# Patient Record
Sex: FEMALE | Race: WHITE | NOT HISPANIC OR LATINO | Employment: FULL TIME | URBAN - METROPOLITAN AREA
[De-identification: names, ages, dates, MRNs, and addresses within clinical notes are randomized per-mention and may not be internally consistent; named-entity substitution may affect disease eponyms.]

---

## 2021-01-22 ENCOUNTER — IMMUNIZATION (OUTPATIENT)
Dept: PEDIATRICS | Facility: CLINIC | Age: 31
End: 2021-01-22
Payer: COMMERCIAL

## 2021-01-22 PROCEDURE — 0001A PR COVID VAC PFIZER DIL RECON 30 MCG/0.3 ML IM: CPT

## 2021-01-22 PROCEDURE — 91300 PR COVID VAC PFIZER DIL RECON 30 MCG/0.3 ML IM: CPT

## 2021-02-12 ENCOUNTER — IMMUNIZATION (OUTPATIENT)
Dept: PEDIATRICS | Facility: CLINIC | Age: 31
End: 2021-02-12
Attending: INTERNAL MEDICINE
Payer: COMMERCIAL

## 2021-02-12 PROCEDURE — 91300 PR COVID VAC PFIZER DIL RECON 30 MCG/0.3 ML IM: CPT

## 2021-02-12 PROCEDURE — 0002A PR COVID VAC PFIZER DIL RECON 30 MCG/0.3 ML IM: CPT

## 2021-03-07 ENCOUNTER — HEALTH MAINTENANCE LETTER (OUTPATIENT)
Age: 31
End: 2021-03-07

## 2021-09-10 ENCOUNTER — TRANSFERRED RECORDS (OUTPATIENT)
Dept: HEALTH INFORMATION MANAGEMENT | Facility: CLINIC | Age: 31
End: 2021-09-10
Payer: COMMERCIAL

## 2021-09-24 ENCOUNTER — MEDICAL CORRESPONDENCE (OUTPATIENT)
Dept: HEALTH INFORMATION MANAGEMENT | Facility: CLINIC | Age: 31
End: 2021-09-24

## 2021-09-24 ENCOUNTER — TRANSFERRED RECORDS (OUTPATIENT)
Dept: HEALTH INFORMATION MANAGEMENT | Facility: CLINIC | Age: 31
End: 2021-09-24

## 2021-09-27 ENCOUNTER — TRANSCRIBE ORDERS (OUTPATIENT)
Dept: MATERNAL FETAL MEDICINE | Facility: CLINIC | Age: 31
End: 2021-09-27

## 2021-09-27 DIAGNOSIS — O26.90 PREGNANCY RELATED CONDITION, ANTEPARTUM: Primary | ICD-10-CM

## 2021-11-05 ENCOUNTER — HOSPITAL ENCOUNTER (OUTPATIENT)
Dept: ULTRASOUND IMAGING | Facility: CLINIC | Age: 31
End: 2021-11-05
Attending: OBSTETRICS & GYNECOLOGY
Payer: COMMERCIAL

## 2021-11-05 ENCOUNTER — OFFICE VISIT (OUTPATIENT)
Dept: MATERNAL FETAL MEDICINE | Facility: CLINIC | Age: 31
End: 2021-11-05
Attending: OBSTETRICS & GYNECOLOGY
Payer: COMMERCIAL

## 2021-11-05 DIAGNOSIS — O26.90 PREGNANCY RELATED CONDITION, ANTEPARTUM: ICD-10-CM

## 2021-11-05 DIAGNOSIS — O35.BXX1: Primary | ICD-10-CM

## 2021-11-05 PROCEDURE — 76811 OB US DETAILED SNGL FETUS: CPT

## 2021-11-05 PROCEDURE — 76811 OB US DETAILED SNGL FETUS: CPT | Mod: 26 | Performed by: OBSTETRICS & GYNECOLOGY

## 2021-11-05 NOTE — PROGRESS NOTES
Please refer to ultrasound report under 'Imaging' Studies of 'Chart Review' tabs.    Celso Eason M.D.

## 2021-11-12 ENCOUNTER — TRANSFERRED RECORDS (OUTPATIENT)
Dept: HEALTH INFORMATION MANAGEMENT | Facility: CLINIC | Age: 31
End: 2021-11-12
Payer: COMMERCIAL

## 2021-11-18 ENCOUNTER — OFFICE VISIT (OUTPATIENT)
Dept: CARDIOLOGY | Facility: CLINIC | Age: 31
End: 2021-11-18
Payer: COMMERCIAL

## 2021-11-18 ENCOUNTER — ANCILLARY PROCEDURE (OUTPATIENT)
Dept: ULTRASOUND IMAGING | Facility: HOSPITAL | Age: 31
End: 2021-11-18
Attending: OBSTETRICS & GYNECOLOGY
Payer: COMMERCIAL

## 2021-11-18 DIAGNOSIS — O35.BXX0 ANOMALY OF HEART OF FETUS AFFECTING PREGNANCY, ANTEPARTUM, SINGLE OR UNSPECIFIED FETUS: Primary | ICD-10-CM

## 2021-11-18 DIAGNOSIS — O35.BXX1: ICD-10-CM

## 2021-11-18 PROCEDURE — 93325 DOPPLER ECHO COLOR FLOW MAPG: CPT

## 2021-11-18 PROCEDURE — 76827 ECHO EXAM OF FETAL HEART: CPT

## 2021-11-18 PROCEDURE — 99204 OFFICE O/P NEW MOD 45 MIN: CPT | Mod: 25 | Performed by: PEDIATRICS

## 2021-11-18 PROCEDURE — 76825 ECHO EXAM OF FETAL HEART: CPT

## 2021-11-18 NOTE — PROGRESS NOTES
Fetal Cardiology Consultation    Patient:  Trupti Harvey MRN:  [id number]   YOB: 1990 Age:  31 year old   Date of Visit:  2021 PCP:  Narda Peng MD   KEEGAN: 2022, by Est. Date of Conception EGA: 20w5d weeks     Dear Dr. Eason:    I had the pleasure of seeing Trupti Harvey at the [facility] Fetal Echocardiography Laboratory in [place] on 2021 in consultation for fetal echocardiography results. She presented today accompanied by her partner. As you know, she is a 31 year old female with current pregnancy achieved through IVF, and suspected fetal ventricular septal defect on [facility] US.    The fetal echocardiogram was abnormal: small midseptal muscular ventricular septal defect. Otherwise normal cardiac anatomy. Normal right and left ventricular size and systolic function. No effusion.     I reviewed and interpreted the fetal echocardiogram today. I discussed the anatomy, physiology, expected fetal course, and need for post-melvi confirmation. The fetal cardiac anatomy is not expected to be dependent on the ductus arteriosus after birth. The expected post-melvi intervention will depend on confirmation of these findings, and is likely to include observation only.     -- No additional fetal echocardiograms are recommended for this pregnancy.  -- A post-melvi transthoracic echocardiogram is recommended to re-evaluate anatomy; this can occur as an outpatient within 2-3 weeks after delivery.  -- Delivery can occur at [facility] as planned.    Thank you for allowing me to participate in Ms. Harvey's care. Please don't hesitate to contact me or the Fetal Cardiology team at [facility] with any questions or concerns.     I spent a total of 30 minutes on the date of the encounter doing chart review, patient history, documentation, counseling, and coordinating care.    Nishant Cervantes MD  Pediatric Cardiology  [facility]  [facility]  Phone [phone]    Review of the result(s) of each unique test - fetal echocardiogram

## 2022-01-13 ENCOUNTER — MEDICAL CORRESPONDENCE (OUTPATIENT)
Dept: HEALTH INFORMATION MANAGEMENT | Facility: CLINIC | Age: 32
End: 2022-01-13
Payer: COMMERCIAL

## 2022-01-31 ENCOUNTER — VIRTUAL VISIT (OUTPATIENT)
Dept: EDUCATION SERVICES | Facility: CLINIC | Age: 32
End: 2022-01-31
Payer: COMMERCIAL

## 2022-01-31 DIAGNOSIS — O24.419 GESTATIONAL DIABETES: Primary | ICD-10-CM

## 2022-01-31 PROCEDURE — G0108 DIAB MANAGE TRN  PER INDIV: HCPCS

## 2022-01-31 RX ORDER — LANCETS
EACH MISCELLANEOUS
Qty: 100 EACH | Refills: 4 | Status: CANCELLED | OUTPATIENT
Start: 2022-01-31

## 2022-01-31 RX ORDER — BLOOD SUGAR DIAGNOSTIC
STRIP MISCELLANEOUS
Qty: 200 STRIP | Refills: 4 | Status: CANCELLED | OUTPATIENT
Start: 2022-01-31

## 2022-01-31 RX ORDER — BLOOD-GLUCOSE METER
1 EACH MISCELLANEOUS ONCE
Qty: 1 KIT | Refills: 0 | Status: CANCELLED | OUTPATIENT
Start: 2022-01-31 | End: 2022-01-31

## 2022-01-31 NOTE — PROGRESS NOTES
Diabetes Self-Management Education & Support  Type of Service: Telephone Visit    Originating Location (Patient Location): Home  Distant Location (Provider Location): Home  Mode of Communication:  Telephone    Telephone Visit Start Time: 10:30  Telephone Visit End Time (telephone visit stop time): 11:30    How would patient like to obtain AVS? Kamryn    SUBJECTIVE/OBJECTIVE:  Presents for education related to gestational diabetes.    Accompanied by: Self  Gestational weeks: 31w2d  Had any babies over 9 lbs: No  Previously had Gestational Diabetes: No    Cultural Influences/Ethnic Background:  Not  or     Estimated Date of Delivery: 2022    1 hour OGTT  No results found for: GLU1      3 hour OGTT      Lifestyle and Health Behaviors:  Exercise:: Yes  How intense was your typical exercise? : Moderate (like brisk walking)  Meal planning/habits: None  Meals include: Breakfast,Lunch,Dinner  Beverages: Water  Biggest challenges to healthy eating: None  Pre-melvi vitamin?: Yes  Supplements?: No  Experiencing nausea?: No  Experiencing heartburn?: No    Healthy Coping:  Emotional response to diabetes: Ready to learn  Stage of change: PREPARATION (Decided to change - considering how)    Current Management:  Taking medications for gestational diabetes?: No    ASSESSMENT:  First pregnancy, went through IVF, very motivated to keep pregnancy healthy. Is a dentist.     INTERVENTION:  Patient was instructed previously provided with a meter at her OB clinic, testing for 3 weeks now. All fastings in target, all but 3 total post meal numbers <140.    Educational topics covered today:  GDM diagnosis, pathophysiology, Risks and Complications of GDM, Means of controlling GDM, Using a Blood Glucose Monitor, Blood Glucose Goals, Logging and Interpreting Glucose Results, Ketone Testing, When to Call a Diabetes Educator or OB Provider, Healthy Eating During Pregnancy, Counting Carbohydrates, Meal Planning for GDM, and  Physical Activity    Educational materials provided today:   Tenzin Understanding Gestational Diabetes  GDM Log Book  Sharps Disposal  Care After Delivery    Pt verbalized understanding of concepts discussed and recommendations provided today.     PLAN:  Check glucose 4 times daily, before breakfast and 1 hour after each meal.     Check Ketones daily for one week, if negative, reduce testing to once a week.     Physical activity recommended: as able.    Meal plan: 2-3 carbs at breakfast, 3-4 carbs at lunch, 3-4 carbs at supper, 1-2 carbs at 3 snacks a day.  Follow consistent CHO meal plan, eat CHO and protein/fat at all meals/snacks.    Call/e-mail/weipasshart message diabetes educator if 3 or more blood sugars are above the goal in 1 week, if ketones are positive, or with questions/concerns.    SHARRON Ruiz  Time Spent: 60 minutes  Encounter Type: Individual    Any diabetes medication dose changes were made via the CDE Protocol and Collaborative Practice Agreement with the patient's OB/GYN provider. A copy of this encounter was shared with the provider

## 2022-02-07 ENCOUNTER — VIRTUAL VISIT (OUTPATIENT)
Dept: EDUCATION SERVICES | Facility: CLINIC | Age: 32
End: 2022-02-07
Payer: COMMERCIAL

## 2022-02-07 DIAGNOSIS — O24.410 DIET CONTROLLED GESTATIONAL DIABETES MELLITUS (GDM) IN THIRD TRIMESTER: Primary | ICD-10-CM

## 2022-02-07 PROCEDURE — 98967 PH1 ASSMT&MGMT NQHP 11-20: CPT | Performed by: DIETITIAN, REGISTERED

## 2022-02-07 NOTE — PROGRESS NOTES
Diabetes Self-Management Education & Support    SUBJECTIVE/OBJECTIVE:  Presents for education related to gestational diabetes.    Accompanied by: Self  Gestational weeks: 32w2d  Had any babies over 9 lbs: No  Previously had Gestational Diabetes: No    Cultural Influences/Ethnic Background:  Not  or     There were no vitals taken for this visit.        Estimated Date of Delivery: 2022    Blood Glucose/Ketone Log          Lifestyle and Health Behaviors:  Exercise:: Yes  How intense was your typical exercise? : Moderate (like brisk walking)  Meal planning/habits: None  Meals include: Breakfast,Lunch,Dinner,Morning Snack,Afternoon Snack,Evening Snack  Beverages: Water  Biggest challenges to healthy eating: None  Pre- vitamin?: Yes  Supplements?: No  Experiencing nausea?: No  Experiencing heartburn?: No    Healthy Coping:  Emotional response to diabetes: Ready to learn  Stage of change: ACTION (Actively working towards change)    Current Management:  Taking medications for gestational diabetes?: No    ASSESSMENT:  Ketones: na.   Fasting blood glucoses: 100% in target.  After breakfast: 100% in target.  After lunch: 71% in target.  After dinner: 85% in target.    Doing well with the meal plan. Does admit her work days can be stressful, wonders if that contributes.    INTERVENTION:  Educational topics covered today:  What to expect after delivery, Future testing for Type 2 diabetes (2 hour OGTT at 6 week post-partum check-up and annual fasting blood glucose level), Risk of GDM and planning ahead for future pregnancies, Recommended lifestyle interventions for reducing the risk of Type 2 Diabetes, When to Call a Diabetes Educator or OB Provider    Educational Materials provided today:  Tenzin Preventing Diabetes    PLAN:  Check glucose 4 times daily.  Check ketones once a week when readings are consistently negative.  Continue with recommended physical activity.  Continue to follow recommended  meal plan: 2-3 carbs at breakfast, 3-4 carbs at lunch, 3-4 carbs at supper, 1-2 carbs at snacks.  Follow consistent CHO meal plan, eat CHO and protein/fat at all meals/snacks.    Call/e-mail/MyChart message diabetes educator if 3 or more blood sugars are above the goal in 1 week or if ketones are positive.    SHARRON Ruiz [facility]  Time Spent: 18:20 minutes  Encounter Type: Individual    Any diabetes medication dose changes were made via the CDE Protocol and Collaborative Practice Agreement with the patient's OB/GYN provider. A copy of this encounter was shared with the provider.

## 2022-02-14 ENCOUNTER — MYC MEDICAL ADVICE (OUTPATIENT)
Dept: EDUCATION SERVICES | Facility: CLINIC | Age: 32
End: 2022-02-14
Payer: COMMERCIAL

## 2022-02-14 NOTE — TELEPHONE ENCOUNTER
Gestational Diabetes Follow-up    Subjective/Objective:    Trupti Harvey sent in blood glucose log for review. Last date of communication was: 2/7.    Gestational diabetes is being managed with diet and activity    Taking diabetes medications: no    Estimated Date of Delivery: Apr 2, 2022    BG/Food Log:           Assessment:    Ketones: .   Fasting blood glucoses: 80% in target.  After breakfast: 100% in target.  Before lunch: -% in target.  After lunch: 89% in target.  Before dinner: -% in target.  After dinner: 89% in target.    Plan/Response:  No changes in the patient's current treatment plan.  Follow-up in 1 week.    .Genia Aguirre RD, LD, CDE    Any diabetes medication dose changes were made via the CDE Protocol and Collaborative Practice Agreement with the patient's referring provider. A copy of this encounter was shared with the provider.

## 2022-02-16 ENCOUNTER — MYC MEDICAL ADVICE (OUTPATIENT)
Dept: EDUCATION SERVICES | Facility: CLINIC | Age: 32
End: 2022-02-16
Payer: COMMERCIAL

## 2022-02-16 NOTE — LETTER
"    2/16/2022        RE: Trupti Hidalgo Candice  [address] [place] [address]  [place] [address]        Gestational Diabetes Follow-up    Subjective/Objective:    Trupti Harvey sent in blood glucose log for review. Last date of communication was: 2-.    Gestational diabetes is being managed with diet and activity    Taking diabetes medications: no    Estimated Date of Delivery: Apr 2, 2022    BG/Food Log:         Assessment:    Ketones: Not noted.   Fasting blood glucoses: 0% in target.  After breakfast: 100% in target.  Before lunch: x% in target.  After lunch: 75% in target.  Before dinner: x% in target.  After dinner: 75% in target.    [employer] message from pt:  \"Here are my numbers for the week so far. Please let me know what you think. I feel very good about my post meal numbers! Thank you.   Trupti\"    Plan/Response:  Recommend that patient begin insulin to help with fasting blood glucose levels.  Recommend referral to [facility] be completed by Amber AREVALO.  RivalHealthhart response sent to pt.    [employer] reply sent to pt:  Trupti,  Thank you so much for sending in your BG and food records. I agree the post meal numbers are looking great! However, the fasting BG's continue to remain above goal for the week. These are the most difficult BG's to control and typically the ones we need to start insulin for in most patients.  I have contacted Amber AREVALO and alerted them to your need to start insulin. They will be referring you to the [facility] for insulin start and management. Please follow-up with Amber AREVALO if you don't hear from the [facility] by Monday.    ANGELITO Gonzalez RN      Any diabetes medication dose changes were made via the CDE Protocol and Collaborative Practice Agreement with the patient's OB/GYN provider. A copy of this encounter was shared with the provider.            Sincerely,      ANGELITO Gonzalez RN        "

## 2022-02-18 NOTE — TELEPHONE ENCOUNTER
"Gestational Diabetes Follow-up    Subjective/Objective:    Trupti Harvey sent in blood glucose log for review. Last date of communication was: 2-.    Gestational diabetes is being managed with diet and activity    Taking diabetes medications: no    Estimated Date of Delivery: Apr 2, 2022    BG/Food Log:         Assessment:    Ketones: Not noted.   Fasting blood glucoses: 0% in target.  After breakfast: 100% in target.  Before lunch: x% in target.  After lunch: 75% in target.  Before dinner: x% in target.  After dinner: 75% in target.    [employer] message from pt:  \"Here are my numbers for the week so far. Please let me know what you think. I feel very good about my post meal numbers! Thank you.   Trupti\"    Plan/Response:  Recommend that patient begin insulin to help with fasting blood glucose levels.  Recommend referral to [facility] be completed by Amber AREVALO.  MyChart response sent to pt.    Particlehart reply sent to pt:  Trupti,  Thank you so much for sending in your BG and food records. I agree the post meal numbers are looking great! However, the fasting BG's continue to remain above goal for the week. These are the most difficult BG's to control and typically the ones we need to start insulin for in most patients.  I have contacted Amber AREVALO and alerted them to your need to start insulin. They will be referring you to the [facility] for insulin start and management. Please follow-up with Amber AREVALO if you don't hear from the [facility] by Monday.    Brie Simms RN, [facility]      Any diabetes medication dose changes were made via the CDE Protocol and Collaborative Practice Agreement with the patient's OB/GYN provider. A copy of this encounter was shared with the provider.      "

## 2022-02-21 ENCOUNTER — MYC MEDICAL ADVICE (OUTPATIENT)
Dept: EDUCATION SERVICES | Facility: CLINIC | Age: 32
End: 2022-02-21
Payer: COMMERCIAL

## 2022-02-25 ENCOUNTER — MYC MEDICAL ADVICE (OUTPATIENT)
Dept: EDUCATION SERVICES | Facility: CLINIC | Age: 32
End: 2022-02-25
Payer: COMMERCIAL

## 2022-02-25 ENCOUNTER — TELEPHONE (OUTPATIENT)
Dept: EDUCATION SERVICES | Facility: CLINIC | Age: 32
End: 2022-02-25
Payer: COMMERCIAL

## 2022-02-25 NOTE — TELEPHONE ENCOUNTER
Trupti sent the [employer] message above and her most recent week of BG results. We referred her back to Amber AREVALO last week (per their protocol) so they could refer her to [facility]. Trupti is requesting our opinion on her BG's and starting insulin, but since she is technically not our patient anymore I can only give her general information about what we do when mom's need to start insulin.    [employer] Notes response:  Trupti,    I am so sorry to hear about your experience at [facility] today. If fasting BG's are more than 50% above goal then we have moms take NPH insulin at bedtime. The dose is weight based. We determine meal time insulin (humalog or novolog) for each meal separately and the starting dose is also weight based. I can't give any more specific recommendations since we are not following you for the insulin start and management. Please be sure to follow-up with your OBGYN about your concerns.    Thank you!    Brie Simms RN, [facility]

## 2022-02-28 ENCOUNTER — MYC MEDICAL ADVICE (OUTPATIENT)
Dept: EDUCATION SERVICES | Facility: CLINIC | Age: 32
End: 2022-02-28
Payer: COMMERCIAL

## 2022-02-28 NOTE — TELEPHONE ENCOUNTER
Spoke with Fatou (triage nurse) at [facility] about message from Trupti. Since we referred her back to them for Endo referral we are not able to keep working with pt. Fatou said that Trupti did update them about her experience with an Endo at [facility] clinic of Endocrinology and they placed a new referral for an endo at [facility].    I responded to Trupti's [employer] message and Fatou plans to call her to discuss next steps.    Brie Simms RN, [facility]

## 2022-03-24 ENCOUNTER — HOSPITAL ENCOUNTER (INPATIENT)
Facility: CLINIC | Age: 32
LOS: 2 days | Discharge: HOME HEALTH | End: 2022-03-26
Attending: OBSTETRICS & GYNECOLOGY | Admitting: OBSTETRICS & GYNECOLOGY
Payer: COMMERCIAL

## 2022-03-24 ENCOUNTER — ANESTHESIA (OUTPATIENT)
Dept: OBGYN | Facility: CLINIC | Age: 32
End: 2022-03-24
Payer: COMMERCIAL

## 2022-03-24 ENCOUNTER — ANESTHESIA EVENT (OUTPATIENT)
Dept: OBGYN | Facility: CLINIC | Age: 32
End: 2022-03-24
Payer: COMMERCIAL

## 2022-03-24 LAB
ABO/RH(D): NORMAL
ANTIBODY SCREEN: NEGATIVE
ERYTHROCYTE [DISTWIDTH] IN BLOOD BY AUTOMATED COUNT: 12.9 % (ref 10–15)
GLUCOSE BLDC GLUCOMTR-MCNC: 104 MG/DL (ref 70–99)
GLUCOSE BLDC GLUCOMTR-MCNC: 135 MG/DL (ref 70–99)
GLUCOSE BLDC GLUCOMTR-MCNC: 140 MG/DL (ref 70–99)
GLUCOSE BLDC GLUCOMTR-MCNC: 89 MG/DL (ref 70–99)
GLUCOSE BLDC GLUCOMTR-MCNC: 98 MG/DL (ref 70–99)
HCT VFR BLD AUTO: 36.6 % (ref 35–47)
HGB BLD-MCNC: 12.6 G/DL (ref 11.7–15.7)
MCH RBC QN AUTO: 32.5 PG (ref 26.5–33)
MCHC RBC AUTO-ENTMCNC: 34.4 G/DL (ref 31.5–36.5)
MCV RBC AUTO: 94 FL (ref 78–100)
PLATELET # BLD AUTO: 216 10E3/UL (ref 150–450)
RBC # BLD AUTO: 3.88 10E6/UL (ref 3.8–5.2)
RUPTURE OF FETAL MEMBRANES BY ROM PLUS: POSITIVE
SARS-COV-2 RNA RESP QL NAA+PROBE: NEGATIVE
SPECIMEN EXPIRATION DATE: NORMAL
T PALLIDUM AB SER QL: NONREACTIVE
WBC # BLD AUTO: 10.1 10E3/UL (ref 4–11)

## 2022-03-24 PROCEDURE — 82962 GLUCOSE BLOOD TEST: CPT

## 2022-03-24 PROCEDURE — 36415 COLL VENOUS BLD VENIPUNCTURE: CPT | Performed by: OBSTETRICS & GYNECOLOGY

## 2022-03-24 PROCEDURE — 85027 COMPLETE CBC AUTOMATED: CPT | Performed by: OBSTETRICS & GYNECOLOGY

## 2022-03-24 PROCEDURE — 86850 RBC ANTIBODY SCREEN: CPT | Performed by: OBSTETRICS & GYNECOLOGY

## 2022-03-24 PROCEDURE — 250N000013 HC RX MED GY IP 250 OP 250 PS 637: Performed by: OBSTETRICS & GYNECOLOGY

## 2022-03-24 PROCEDURE — 120N000012 HC R&B POSTPARTUM

## 2022-03-24 PROCEDURE — 250N000011 HC RX IP 250 OP 636: Performed by: SURGERY

## 2022-03-24 PROCEDURE — 722N000001 HC LABOR CARE VAGINAL DELIVERY SINGLE

## 2022-03-24 PROCEDURE — 370N000003 HC ANESTHESIA WARD SERVICE

## 2022-03-24 PROCEDURE — G0463 HOSPITAL OUTPT CLINIC VISIT: HCPCS | Mod: 25

## 2022-03-24 PROCEDURE — 86780 TREPONEMA PALLIDUM: CPT | Performed by: OBSTETRICS & GYNECOLOGY

## 2022-03-24 PROCEDURE — 258N000003 HC RX IP 258 OP 636: Performed by: OBSTETRICS & GYNECOLOGY

## 2022-03-24 PROCEDURE — 0UQMXZZ REPAIR VULVA, EXTERNAL APPROACH: ICD-10-PCS | Performed by: OBSTETRICS & GYNECOLOGY

## 2022-03-24 PROCEDURE — 00HU33Z INSERTION OF INFUSION DEVICE INTO SPINAL CANAL, PERCUTANEOUS APPROACH: ICD-10-PCS | Performed by: SURGERY

## 2022-03-24 PROCEDURE — 59025 FETAL NON-STRESS TEST: CPT

## 2022-03-24 PROCEDURE — 3E0R3BZ INTRODUCTION OF ANESTHETIC AGENT INTO SPINAL CANAL, PERCUTANEOUS APPROACH: ICD-10-PCS | Performed by: SURGERY

## 2022-03-24 PROCEDURE — 84112 EVAL AMNIOTIC FLUID PROTEIN: CPT | Performed by: OBSTETRICS & GYNECOLOGY

## 2022-03-24 PROCEDURE — 250N000009 HC RX 250: Performed by: OBSTETRICS & GYNECOLOGY

## 2022-03-24 PROCEDURE — U0003 INFECTIOUS AGENT DETECTION BY NUCLEIC ACID (DNA OR RNA); SEVERE ACUTE RESPIRATORY SYNDROME CORONAVIRUS 2 (SARS-COV-2) (CORONAVIRUS DISEASE [COVID-19]), AMPLIFIED PROBE TECHNIQUE, MAKING USE OF HIGH THROUGHPUT TECHNOLOGIES AS DESCRIBED BY CMS-2020-01-R: HCPCS | Performed by: OBSTETRICS & GYNECOLOGY

## 2022-03-24 PROCEDURE — 258N000003 HC RX IP 258 OP 636: Performed by: SURGERY

## 2022-03-24 PROCEDURE — 86901 BLOOD TYPING SEROLOGIC RH(D): CPT | Performed by: OBSTETRICS & GYNECOLOGY

## 2022-03-24 RX ORDER — METOCLOPRAMIDE 10 MG/1
10 TABLET ORAL EVERY 6 HOURS PRN
Status: DISCONTINUED | OUTPATIENT
Start: 2022-03-24 | End: 2022-03-24 | Stop reason: HOSPADM

## 2022-03-24 RX ORDER — ONDANSETRON 4 MG/1
4 TABLET, ORALLY DISINTEGRATING ORAL EVERY 6 HOURS PRN
Status: DISCONTINUED | OUTPATIENT
Start: 2022-03-24 | End: 2022-03-24 | Stop reason: HOSPADM

## 2022-03-24 RX ORDER — VITAMIN A ACETATE, .BETA.-CAROTENE, ASCORBIC ACID, CHOLECALCIFEROL, .ALPHA.-TOCOPHEROL ACETATE, DL-, THIAMINE MONONITRATE, RIBOFLAVIN, NIACINAMIDE, PYRIDOXINE HYDROCHLORIDE, FOLIC ACID, CYANOCOBALAMIN, CALCIUM CARBONATE, FERROUS FUMARATE, ZINC OXIDE, AND CUPRIC OXIDE 2000; 2000; 120; 400; 22; 1.84; 3; 20; 10; 1; 12; 200; 27; 25; 2 [IU]/1; [IU]/1; MG/1; [IU]/1; MG/1; MG/1; MG/1; MG/1; MG/1; MG/1; UG/1; MG/1; MG/1; MG/1; MG/1
1 TABLET ORAL DAILY
COMMUNITY

## 2022-03-24 RX ORDER — OXYTOCIN/0.9 % SODIUM CHLORIDE 30/500 ML
100-340 PLASTIC BAG, INJECTION (ML) INTRAVENOUS CONTINUOUS PRN
Status: DISCONTINUED | OUTPATIENT
Start: 2022-03-24 | End: 2022-03-26 | Stop reason: HOSPADM

## 2022-03-24 RX ORDER — OXYTOCIN 10 [USP'U]/ML
10 INJECTION, SOLUTION INTRAMUSCULAR; INTRAVENOUS
Status: DISCONTINUED | OUTPATIENT
Start: 2022-03-24 | End: 2022-03-24 | Stop reason: HOSPADM

## 2022-03-24 RX ORDER — MISOPROSTOL 200 UG/1
400 TABLET ORAL
Status: DISCONTINUED | OUTPATIENT
Start: 2022-03-24 | End: 2022-03-24 | Stop reason: HOSPADM

## 2022-03-24 RX ORDER — ONDANSETRON 2 MG/ML
4 INJECTION INTRAMUSCULAR; INTRAVENOUS EVERY 6 HOURS PRN
Status: DISCONTINUED | OUTPATIENT
Start: 2022-03-24 | End: 2022-03-24 | Stop reason: HOSPADM

## 2022-03-24 RX ORDER — CARBOPROST TROMETHAMINE 250 UG/ML
250 INJECTION, SOLUTION INTRAMUSCULAR
Status: DISCONTINUED | OUTPATIENT
Start: 2022-03-24 | End: 2022-03-24 | Stop reason: HOSPADM

## 2022-03-24 RX ORDER — METHYLERGONOVINE MALEATE 0.2 MG/ML
200 INJECTION INTRAVENOUS
Status: DISCONTINUED | OUTPATIENT
Start: 2022-03-24 | End: 2022-03-24 | Stop reason: HOSPADM

## 2022-03-24 RX ORDER — MISOPROSTOL 200 UG/1
800 TABLET ORAL
Status: DISCONTINUED | OUTPATIENT
Start: 2022-03-24 | End: 2022-03-26 | Stop reason: HOSPADM

## 2022-03-24 RX ORDER — NICOTINE POLACRILEX 4 MG
15-30 LOZENGE BUCCAL
Status: DISCONTINUED | OUTPATIENT
Start: 2022-03-24 | End: 2022-03-24 | Stop reason: HOSPADM

## 2022-03-24 RX ORDER — OXYTOCIN 10 [USP'U]/ML
10 INJECTION, SOLUTION INTRAMUSCULAR; INTRAVENOUS
Status: DISCONTINUED | OUTPATIENT
Start: 2022-03-24 | End: 2022-03-26 | Stop reason: HOSPADM

## 2022-03-24 RX ORDER — LIDOCAINE 40 MG/G
CREAM TOPICAL
Status: DISCONTINUED | OUTPATIENT
Start: 2022-03-24 | End: 2022-03-24 | Stop reason: HOSPADM

## 2022-03-24 RX ORDER — MISOPROSTOL 200 UG/1
400 TABLET ORAL
Status: DISCONTINUED | OUTPATIENT
Start: 2022-03-24 | End: 2022-03-26 | Stop reason: HOSPADM

## 2022-03-24 RX ORDER — NICOTINE POLACRILEX 4 MG
15-30 LOZENGE BUCCAL
Status: DISCONTINUED | OUTPATIENT
Start: 2022-03-24 | End: 2022-03-26 | Stop reason: HOSPADM

## 2022-03-24 RX ORDER — METOCLOPRAMIDE HYDROCHLORIDE 5 MG/ML
10 INJECTION INTRAMUSCULAR; INTRAVENOUS EVERY 6 HOURS PRN
Status: DISCONTINUED | OUTPATIENT
Start: 2022-03-24 | End: 2022-03-24 | Stop reason: HOSPADM

## 2022-03-24 RX ORDER — NALOXONE HYDROCHLORIDE 0.4 MG/ML
0.2 INJECTION, SOLUTION INTRAMUSCULAR; INTRAVENOUS; SUBCUTANEOUS
Status: DISCONTINUED | OUTPATIENT
Start: 2022-03-24 | End: 2022-03-24 | Stop reason: HOSPADM

## 2022-03-24 RX ORDER — OXYTOCIN/0.9 % SODIUM CHLORIDE 30/500 ML
1-24 PLASTIC BAG, INJECTION (ML) INTRAVENOUS CONTINUOUS
Status: DISCONTINUED | OUTPATIENT
Start: 2022-03-24 | End: 2022-03-24 | Stop reason: HOSPADM

## 2022-03-24 RX ORDER — SODIUM CHLORIDE, SODIUM LACTATE, POTASSIUM CHLORIDE, CALCIUM CHLORIDE 600; 310; 30; 20 MG/100ML; MG/100ML; MG/100ML; MG/100ML
INJECTION, SOLUTION INTRAVENOUS CONTINUOUS PRN
Status: DISCONTINUED | OUTPATIENT
Start: 2022-03-24 | End: 2022-03-24 | Stop reason: HOSPADM

## 2022-03-24 RX ORDER — DEXTROSE MONOHYDRATE 25 G/50ML
25-50 INJECTION, SOLUTION INTRAVENOUS
Status: DISCONTINUED | OUTPATIENT
Start: 2022-03-24 | End: 2022-03-24 | Stop reason: HOSPADM

## 2022-03-24 RX ORDER — IBUPROFEN 400 MG/1
800 TABLET, FILM COATED ORAL EVERY 6 HOURS PRN
Status: DISCONTINUED | OUTPATIENT
Start: 2022-03-24 | End: 2022-03-26 | Stop reason: HOSPADM

## 2022-03-24 RX ORDER — ROPIVACAINE HYDROCHLORIDE 2 MG/ML
INJECTION, SOLUTION EPIDURAL; INFILTRATION; PERINEURAL
Status: COMPLETED | OUTPATIENT
Start: 2022-03-24 | End: 2022-03-24

## 2022-03-24 RX ORDER — EPHEDRINE SULFATE 50 MG/ML
5 INJECTION, SOLUTION INTRAMUSCULAR; INTRAVENOUS; SUBCUTANEOUS
Status: DISCONTINUED | OUTPATIENT
Start: 2022-03-24 | End: 2022-03-24 | Stop reason: HOSPADM

## 2022-03-24 RX ORDER — NALOXONE HYDROCHLORIDE 0.4 MG/ML
0.4 INJECTION, SOLUTION INTRAMUSCULAR; INTRAVENOUS; SUBCUTANEOUS
Status: DISCONTINUED | OUTPATIENT
Start: 2022-03-24 | End: 2022-03-24 | Stop reason: HOSPADM

## 2022-03-24 RX ORDER — OXYTOCIN/0.9 % SODIUM CHLORIDE 30/500 ML
340 PLASTIC BAG, INJECTION (ML) INTRAVENOUS CONTINUOUS PRN
Status: DISCONTINUED | OUTPATIENT
Start: 2022-03-24 | End: 2022-03-24 | Stop reason: HOSPADM

## 2022-03-24 RX ORDER — BISACODYL 10 MG
10 SUPPOSITORY, RECTAL RECTAL DAILY PRN
Status: DISCONTINUED | OUTPATIENT
Start: 2022-03-24 | End: 2022-03-26 | Stop reason: HOSPADM

## 2022-03-24 RX ORDER — DEXTROSE MONOHYDRATE 25 G/50ML
25-50 INJECTION, SOLUTION INTRAVENOUS
Status: DISCONTINUED | OUTPATIENT
Start: 2022-03-24 | End: 2022-03-26 | Stop reason: HOSPADM

## 2022-03-24 RX ORDER — FENTANYL CITRATE 50 UG/ML
100 INJECTION, SOLUTION INTRAMUSCULAR; INTRAVENOUS
Status: DISCONTINUED | OUTPATIENT
Start: 2022-03-24 | End: 2022-03-24 | Stop reason: HOSPADM

## 2022-03-24 RX ORDER — TRANEXAMIC ACID 10 MG/ML
1 INJECTION, SOLUTION INTRAVENOUS EVERY 30 MIN PRN
Status: DISCONTINUED | OUTPATIENT
Start: 2022-03-24 | End: 2022-03-24 | Stop reason: HOSPADM

## 2022-03-24 RX ORDER — NALBUPHINE HYDROCHLORIDE 10 MG/ML
2.5-5 INJECTION, SOLUTION INTRAMUSCULAR; INTRAVENOUS; SUBCUTANEOUS EVERY 6 HOURS PRN
Status: DISCONTINUED | OUTPATIENT
Start: 2022-03-24 | End: 2022-03-26 | Stop reason: HOSPADM

## 2022-03-24 RX ORDER — SODIUM CHLORIDE, SODIUM LACTATE, POTASSIUM CHLORIDE, CALCIUM CHLORIDE 600; 310; 30; 20 MG/100ML; MG/100ML; MG/100ML; MG/100ML
INJECTION, SOLUTION INTRAVENOUS CONTINUOUS
Status: DISCONTINUED | OUTPATIENT
Start: 2022-03-24 | End: 2022-03-24 | Stop reason: HOSPADM

## 2022-03-24 RX ORDER — DOCUSATE SODIUM 100 MG/1
100 CAPSULE, LIQUID FILLED ORAL DAILY
Status: DISCONTINUED | OUTPATIENT
Start: 2022-03-24 | End: 2022-03-26 | Stop reason: HOSPADM

## 2022-03-24 RX ORDER — METHYLERGONOVINE MALEATE 0.2 MG/ML
200 INJECTION INTRAVENOUS
Status: DISCONTINUED | OUTPATIENT
Start: 2022-03-24 | End: 2022-03-26 | Stop reason: HOSPADM

## 2022-03-24 RX ORDER — CITRIC ACID/SODIUM CITRATE 334-500MG
30 SOLUTION, ORAL ORAL
Status: DISCONTINUED | OUTPATIENT
Start: 2022-03-24 | End: 2022-03-24 | Stop reason: HOSPADM

## 2022-03-24 RX ORDER — MODIFIED LANOLIN
OINTMENT (GRAM) TOPICAL
Status: DISCONTINUED | OUTPATIENT
Start: 2022-03-24 | End: 2022-03-26 | Stop reason: HOSPADM

## 2022-03-24 RX ORDER — PROCHLORPERAZINE 25 MG
25 SUPPOSITORY, RECTAL RECTAL EVERY 12 HOURS PRN
Status: DISCONTINUED | OUTPATIENT
Start: 2022-03-24 | End: 2022-03-24 | Stop reason: HOSPADM

## 2022-03-24 RX ORDER — ACETAMINOPHEN 325 MG/1
650 TABLET ORAL EVERY 4 HOURS PRN
Status: DISCONTINUED | OUTPATIENT
Start: 2022-03-24 | End: 2022-03-26 | Stop reason: HOSPADM

## 2022-03-24 RX ORDER — MISOPROSTOL 200 UG/1
800 TABLET ORAL
Status: DISCONTINUED | OUTPATIENT
Start: 2022-03-24 | End: 2022-03-24 | Stop reason: HOSPADM

## 2022-03-24 RX ORDER — HYDROCORTISONE 2.5 %
CREAM (GRAM) TOPICAL 3 TIMES DAILY PRN
Status: DISCONTINUED | OUTPATIENT
Start: 2022-03-24 | End: 2022-03-26 | Stop reason: HOSPADM

## 2022-03-24 RX ORDER — IBUPROFEN 400 MG/1
800 TABLET, FILM COATED ORAL
Status: DISCONTINUED | OUTPATIENT
Start: 2022-03-24 | End: 2022-03-25

## 2022-03-24 RX ORDER — CARBOPROST TROMETHAMINE 250 UG/ML
250 INJECTION, SOLUTION INTRAMUSCULAR
Status: DISCONTINUED | OUTPATIENT
Start: 2022-03-24 | End: 2022-03-26 | Stop reason: HOSPADM

## 2022-03-24 RX ORDER — PROCHLORPERAZINE MALEATE 5 MG
10 TABLET ORAL EVERY 6 HOURS PRN
Status: DISCONTINUED | OUTPATIENT
Start: 2022-03-24 | End: 2022-03-24 | Stop reason: HOSPADM

## 2022-03-24 RX ORDER — TRANEXAMIC ACID 10 MG/ML
1 INJECTION, SOLUTION INTRAVENOUS EVERY 30 MIN PRN
Status: DISCONTINUED | OUTPATIENT
Start: 2022-03-24 | End: 2022-03-26 | Stop reason: HOSPADM

## 2022-03-24 RX ORDER — KETOROLAC TROMETHAMINE 30 MG/ML
30 INJECTION, SOLUTION INTRAMUSCULAR; INTRAVENOUS
Status: DISCONTINUED | OUTPATIENT
Start: 2022-03-24 | End: 2022-03-25

## 2022-03-24 RX ORDER — SODIUM CHLORIDE 9 MG/ML
INJECTION, SOLUTION INTRAVENOUS CONTINUOUS
Status: DISCONTINUED | OUTPATIENT
Start: 2022-03-24 | End: 2022-03-24 | Stop reason: HOSPADM

## 2022-03-24 RX ORDER — ACETAMINOPHEN 325 MG/1
650 TABLET ORAL EVERY 4 HOURS PRN
Status: DISCONTINUED | OUTPATIENT
Start: 2022-03-24 | End: 2022-03-24 | Stop reason: HOSPADM

## 2022-03-24 RX ORDER — ROPIVACAINE HYDROCHLORIDE 2 MG/ML
10 INJECTION, SOLUTION EPIDURAL; INFILTRATION; PERINEURAL ONCE
Status: DISCONTINUED | OUTPATIENT
Start: 2022-03-24 | End: 2022-03-24 | Stop reason: HOSPADM

## 2022-03-24 RX ORDER — OXYTOCIN/0.9 % SODIUM CHLORIDE 30/500 ML
340 PLASTIC BAG, INJECTION (ML) INTRAVENOUS CONTINUOUS PRN
Status: DISCONTINUED | OUTPATIENT
Start: 2022-03-24 | End: 2022-03-26 | Stop reason: HOSPADM

## 2022-03-24 RX ADMIN — SODIUM CHLORIDE, POTASSIUM CHLORIDE, SODIUM LACTATE AND CALCIUM CHLORIDE: 600; 310; 30; 20 INJECTION, SOLUTION INTRAVENOUS at 10:09

## 2022-03-24 RX ADMIN — SODIUM CHLORIDE, POTASSIUM CHLORIDE, SODIUM LACTATE AND CALCIUM CHLORIDE 1000 ML: 600; 310; 30; 20 INJECTION, SOLUTION INTRAVENOUS at 10:08

## 2022-03-24 RX ADMIN — BUPIVACAINE HYDROCHLORIDE: 7.5 INJECTION, SOLUTION EPIDURAL; RETROBULBAR at 09:24

## 2022-03-24 RX ADMIN — Medication 2 MILLI-UNITS/MIN: at 06:13

## 2022-03-24 RX ADMIN — IBUPROFEN 800 MG: 400 TABLET, FILM COATED ORAL at 17:40

## 2022-03-24 RX ADMIN — SODIUM CHLORIDE, POTASSIUM CHLORIDE, SODIUM LACTATE AND CALCIUM CHLORIDE: 600; 310; 30; 20 INJECTION, SOLUTION INTRAVENOUS at 06:12

## 2022-03-24 RX ADMIN — ROPIVACAINE HYDROCHLORIDE 10 ML: 2 INJECTION, SOLUTION EPIDURAL; INFILTRATION at 10:01

## 2022-03-24 ASSESSMENT — ACTIVITIES OF DAILY LIVING (ADL)
WALKING_OR_CLIMBING_STAIRS_DIFFICULTY: NO
FALL_HISTORY_WITHIN_LAST_SIX_MONTHS: NO
WEAR_GLASSES_OR_BLIND: NO
DRESSING/BATHING_DIFFICULTY: NO
TOILETING_ISSUES: NO
CONCENTRATING,_REMEMBERING_OR_MAKING_DECISIONS_DIFFICULTY: NO
ADLS_ACUITY_SCORE: 3
ADLS_ACUITY_SCORE: 3
DOING_ERRANDS_INDEPENDENTLY_DIFFICULTY: NO
CHANGE_IN_FUNCTIONAL_STATUS_SINCE_ONSET_OF_CURRENT_ILLNESS/INJURY: NO
ADLS_ACUITY_SCORE: 3
DIFFICULTY_EATING/SWALLOWING: NO
ADLS_ACUITY_SCORE: 3
ADLS_ACUITY_SCORE: 3

## 2022-03-24 NOTE — ANESTHESIA PROCEDURE NOTES
Epidural catheter Procedure Note    Pre-Procedure   Staff -        Anesthesiologist:  Dino So MD       Performed By: anesthesiologist       Location: OB       Pre-Anesthestic Checklist: patient identified, IV checked, risks and benefits discussed, informed consent, monitors and equipment checked, pre-op evaluation and at physician/surgeon's request  Timeout:       Correct Patient: Yes        Correct Procedure: Yes        Correct Site: Yes        Correct Position: Yes   Procedure Documentation  Procedure: epidural catheter       Patient Position: sitting       Patient Prep/Sterile Barriers: sterile gloves, mask, patient draped       Skin prep: Betadine       Local skin infiltrated with 3 mL of 1% lidocaine.        Insertion Site: L3-4. (midline approach).       Technique: LORT saline        GABO at 5 cm.       Needle Type: To[employer] Mediay needle       Needle Gauge: 17.        Needle Length (Inches): 3.5        Catheter: 19 G.         Catheter threaded easily.         4 cm epidural space.         Threaded 9 cm at skin.        # of attempts: 1 and  # of redirects: : 0.    Assessment/Narrative         Paresthesias: No.      Test dose of 3 mL lidocaine 1.5% w/ 1:200,000 epinephrine at.         Test dose negative, 3 minutes after injection, for signs of intravascular, subdural, or intrathecal injection.       Insertion/Infusion Method: LORT saline       Aspiration negative for Heme or CSF via Epidural Catheter.    Medication(s) Administered   0.2% Ropivacaine (Epidural), 10 mL  Medication Administration Time: 3/24/2022 10:01 AM     Comments:  Pt tolerated well. No complications.   Catheter taped sterile and securely with sterile medical adhesive spray and tegaderm.   Pt placed back in supine with CARLO.   FHTs stable post-procedure.

## 2022-03-24 NOTE — PROVIDER NOTIFICATION
Data: Patient presented to [facility] at 0048.   Reason for maternal/fetal assessment per patient is Fluid Leak  Patient is a . Prenatal record reviewed.      Gestational Age 38w5d. VSS. Fetal movement present. Patient denies cramping, backache, vaginal discharge, pelvic pressure, UTI symptoms, GI problems, bloody show, vaginal bleeding, edema, headache, visual disturbances, epigastric or URQ pain, abdominal pain.  Rupture of membranes at 2330, clear fluid.  Pt feeling mild pressure. Support persons Wally present.  Action: Verbal consent for EFM. Triage assessment completed. EFM applied for fetal wellbeing during fluid leak. Uterine assessment soft and non tender to touch.        22 0130   Provider Notification   Provider Name/Title Dr. Moran   Method of Notification Phone   Request Evaluate - Remote   Notification Reason Status Update;SVE       Response: Dr. Moran informed of but not limited to above information contraction pattern, SVE, ROMplus positive, FHT's, and blood sugar. Plan per provider is admit to labor and delivery, may have epidural, nitrous oxide, Fentanyl, or epidural for labor pain.  Collect Covid test.  Start Intrapartum Diabetic orderset well in active labor.  No need for treatment of current BS of 135.  Note in Prenatal to call Dr. Noel if pt admitted to labor and delivery unit.  Nurse to call in Am or when pt in active labor to see if able to follow. Patient verbalized agreement with plan. Patient transferred to room 220 ambulatory, oriented to room and call light. Report given to PELON Waggoner RN.

## 2022-03-24 NOTE — PLAN OF CARE
In room for lactation support; per RN, has had mostly breastfeeding attempts. At time of visit, trialed cross cradle hold and football hold. Infant uninterested. Hand expression demonstrated and colostrum easily expressed. A few drops put in Yajaira's mouth. Attempted to assess suckle with gloved finger and she was uninterested.    Trupti agrees to LC support hand expressing colostrum; 3 ml collected to offer via finger feeding. She is able to practice hand expression. Infant initial glucose checks 65 and 51 - monitoring glucose d/t maternal gestational diabetes. If infant continues to be sleepy/uninterested in latching, recommend continuing with hand expression and offering EBM and/or she could begin pumping.    Skin to skin encouraged.    Thuy Smith, TIMOTEO, IBCLC

## 2022-03-24 NOTE — PROGRESS NOTES
"OB Progress Note  3/24/2022  10:38 AM    S:  Pt with good pain control, epidural in place.  No other complaints.      O:  /63   Temp 97.9  F (36.6  C)   Resp 16   Ht 1.626 m (5' 4\")   Wt 78.5 kg (173 lb)   SpO2 97%   BMI 29.70 kg/m    EFM: baseline 120, accelerations are present, no decelerations, moderate variability; Category I  Nettie:  Ctx q1-3 min  SVE:  4/90%/-1  Membranes: SROM at 2330 hours 3/23/22    Pitocin at 4 mU/min    A/P:  32 year old  @38w5d admitted with SROM and inadequate labor.  Now Pitocin augmented labor, cervix 4 cm. GBS negative.  A1GDM, normal blood sugars thus far in labor. Now comfortable with an epidural.      1.  Routine cares  2.  Place Winters and recheck cervix in the next hour or so.   3.  Anticipate   4.  EFW 9#, consider during the second stage of labor or  any arrest disorder.      Tay Ngo MD    "

## 2022-03-24 NOTE — PLAN OF CARE
PC from Dr. Ngo.  MD was updated by text that pt's 2 hour post delivery BS was 140.  Order's rec'd to do am fasting blood sugar.

## 2022-03-24 NOTE — ANESTHESIA PREPROCEDURE EVALUATION
Anesthesia Pre-Procedure Evaluation    Patient: Trupti Harvey   MRN: [id number] : 1990        Procedure :           Past Medical History:   Diagnosis Date     Diabetes (H)     GDDC      No past surgical history on file.   No Known Allergies   Social History     Tobacco Use     Smoking status: Never Smoker     Smokeless tobacco: Never Used   Substance Use Topics     Alcohol use: Not Currently      Wt Readings from Last 1 Encounters:   22 78.5 kg (173 lb)        Anesthesia Evaluation            ROS/MED HX  ENT/Pulmonary:    (-) asthma   Neurologic:  - neg neurologic ROS     Cardiovascular:    (-) PIH   METS/Exercise Tolerance:     Hematologic:     (+) no anticoagulation therapy, no coagulopathy,     Musculoskeletal:       GI/Hepatic:       Renal/Genitourinary:       Endo: Comment: GDM    (+) gestational diabetes,    Psychiatric/Substance Use:       Infectious Disease:       Malignancy:       Other:            Physical Exam    Airway        Mallampati: II   TM distance: > 3 FB   Neck ROM: full     Respiratory Devices and Support         Dental  no notable dental history         Cardiovascular   cardiovascular exam normal          Pulmonary   pulmonary exam normal                OUTSIDE LABS:  CBC:   Lab Results   Component Value Date    WBC 10.1 2022    HGB 12.6 2022    HCT 36.6 2022     2022     BMP:   Lab Results   Component Value Date     (H) 2022    GLC 89 2022     COAGS: No results found for: PTT, INR, FIBR  POC: No results found for: BGM, HCG, HCGS  HEPATIC: No results found for: ALBUMIN, PROTTOTAL, ALT, AST, GGT, ALKPHOS, BILITOTAL, BILIDIRECT, BR[place]  OTHER: No results found for: PH, LACT, A1C, AMISHA, PHOS, MAG, LIPASE, AMYLASE, TSH, T4, T3, CRP, SED    Anesthesia Plan    ASA Status:  2      Anesthesia Type: Epidural.              Consents    Anesthesia Plan(s) and associated risks, benefits, and realistic alternatives discussed. Questions  answered and patient/representative(s) expressed understanding.    - Discussed:     - Discussed with:  Patient         Postoperative Care            Comments:    Other Comments: Orders to manage the epidural infusion have been entered, and through coordination with the nurse, we will continute to manage and monitor the patient's labor epidural.  We will continuously be available to adjust as needed thruout the entire L&D process.             Dino So MD

## 2022-03-24 NOTE — PLAN OF CARE
Viable baby girl delivered at 1553. Pushing started at 1500 and no complications present during pushing. Dr. Ngo present for delivery. Routine postpartum oxytocin initiated after delivery of placenta. Fundus firm at U/U with small lochia flow. Mother and baby doing well. Will continue with routine immediate postpartum recovery and plan to transfer to postpartum unit.

## 2022-03-24 NOTE — PROGRESS NOTES
Data: Trupti Harvey transferred to [facility] via wheelchair at 1845. Baby transferred via parent's arms.  Action: Receiving unit notified of transfer: Yes. Patient and family notified of room change. Report given to Barbara HERNANDEZ RN at 1845. Belongings sent to receiving unit. Accompanied by Registered Nurse. Oriented patient to surroundings. Call light within reach. ID bands double-checked with receiving RN.  Response: Patient tolerated transfer and is stable.

## 2022-03-24 NOTE — PLAN OF CARE
Patient requested epidural at 0845. Fluid bolus started and patient positioned. Consent reviewed and signed with patient. Time out completed. Patient tolerated procedure well and now resting comfortably. Plan to insert blake catheter, recheck cervical progress, and update both Dr. Ngo (on call) and Dr. Liang (will come to deliver). Will continue to monitor closely.

## 2022-03-24 NOTE — PLAN OF CARE
Patient complete at 1350 and feeling small amount of intermittent pressure. Dr. Liang updated to be ready for delivery. Dr. Ngo will be on standby if Dr. Liang is not able to make it. Plan made with Dr. Liang is to labor down until more pressure builds and start pushing at approximately 1445. Will keep both Dr. Liang and Dr. Ngo updated with progress.

## 2022-03-24 NOTE — H&P
Trupti Harvey  [id number]  OB Admit History & Physical      HPI:  Ms. Harvey  is a 32 year old  @ 38w5d by transfer dates/IVF pregnancy who presented to L&D for evaluation of suspected SROM.      Prenatal course:  1st visit at 10  6/7 weeks, regular care, TWG 24#.    Pregnancy complications:  Gestational DM, diet controlled, small VSD in the baby, IVF pregnancy    Prenatal labs:  B positive, antibody screen negative,  Rubella  Immune, Hep B/HIV/RPR all negative, GC/CT negative, GCT abnormal, 3 hour GTT 2 abnormal values consistent with GDM,  GBS negative; genetic screening tests Materni T21 normal, MSAFP normal.     Ultrasound at 10 weeks showed viable lindquist gestation, consistent with  IVF dates    Level II ultrasound normal other than possible small VSD    Fetal echo consistent with small muscular VSD    Growth ultrasound at 32 weeks normal, EFW 92%, AC 97%    BPP and growth ultrasound at 36 weeks EFW 97%ile, AC 99%ile    Weekly BPP's , normal MVP    Plan for induction next week for favorable cervix and IVF pregnancy.     SROM at 2330 hours last night, presented to the [facility] at [facility]    OB history:   OB History    Para Term  AB Living   2 0 0 0 1 0   SAB IAB Ectopic Multiple Live Births   1 0 0 0 0      # Outcome Date GA Lbr Omer/2nd Weight Sex Delivery Anes PTL Lv   2 Current            1 SAB                  PMHx:     Past Medical History:   Diagnosis Date     Diabetes (H)     GDDC       PSHX:  No past surgical history on file.    Meds:    No current outpatient medications on file.       Allergies: Patient has no known allergies.      REVIEW OF SYSTEMS:  Positives and negatives in HPI.     SocHx:    Social History     Socioeconomic History     Marital status:      Spouse name: Not on file     Number of children: Not on file     Years of education: Not on file     Highest education level: Not on file   Occupational History     Not on file   Tobacco Use     Smoking status: Never  "Smoker     Smokeless tobacco: Never Used   Substance and Sexual Activity     Alcohol use: Not Currently     Drug use: Never     Sexual activity: Yes     Partners: Male     Birth control/protection: None   Other Topics Concern     Not on file   Social History Narrative     Not on file     Social Determinants of Health     Financial Resource Strain: Not on file   Food Insecurity: Not on file   Transportation Needs: Not on file   Physical Activity: Not on file   Stress: Not on file   Social Connections: Not on file   Intimate Partner Violence: Not on file   Housing Stability: Not on file        Fam Hx:  No family history on file.      PHYSICAL EXAM:      Vitals:  /75   Temp 98.8  F (37.1  C) (Temporal)   Resp 16   Ht 1.626 m (5' 4\")   Wt 78.5 kg (173 lb)   SpO2 96%   BMI 29.70 kg/m    Alert Awake in NAD  ABD gravid, non-tender, EFW 9#  Cervix:  3-4 cm / 80 % effaced at -1 station, membranes SROM at 2330 hours last night, fluid clear  EFM:  Baseline 130, with moderate variability, accels are present, no decels  [place]: contractions q2-4 min    Blood sugars in range.  135 on admit, then 89 and 104 since midnight  WBC 10.1  Hemoglobin 12.6  Platelets 216,000  Blood type B positive    Covid PCR negative    Assessment:  IUP at 38w5d admitted for evaluation of SROM and contractions.  GBS negative.  A1GDM (diet controlled).  SROM with clear fluid.  Now with Pitocin augmentation to affect adequate labor.  Baby with small muscular VSD to be evaluated during  period.     Plan:  Admission            Continuous fetal and uterine monitoring            Analgesia as needed, epidural OK if and when desired            The plan of care was discussed with the patient and her partner.  They expressed understanding and agreement.             Anticipate             If operative vaginal delivery deemed necessary, evaluate fetal station, fetal descent and potential for shoulder dystocia given the GDM and estimated fetal " weight.        Tay Ngo MD MD  Dept of OB/GYN  March 24, 2022

## 2022-03-25 LAB
GLUCOSE BLDC GLUCOMTR-MCNC: 77 MG/DL (ref 70–99)
HGB BLD-MCNC: 11.9 G/DL (ref 11.7–15.7)

## 2022-03-25 PROCEDURE — 250N000013 HC RX MED GY IP 250 OP 250 PS 637: Performed by: OBSTETRICS & GYNECOLOGY

## 2022-03-25 PROCEDURE — 85018 HEMOGLOBIN: CPT | Performed by: OBSTETRICS & GYNECOLOGY

## 2022-03-25 PROCEDURE — 36415 COLL VENOUS BLD VENIPUNCTURE: CPT | Performed by: OBSTETRICS & GYNECOLOGY

## 2022-03-25 PROCEDURE — 120N000012 HC R&B POSTPARTUM

## 2022-03-25 RX ADMIN — ACETAMINOPHEN 650 MG: 325 TABLET, FILM COATED ORAL at 07:39

## 2022-03-25 RX ADMIN — ACETAMINOPHEN 650 MG: 325 TABLET, FILM COATED ORAL at 15:28

## 2022-03-25 RX ADMIN — IBUPROFEN 800 MG: 400 TABLET, FILM COATED ORAL at 21:29

## 2022-03-25 RX ADMIN — IBUPROFEN 800 MG: 400 TABLET, FILM COATED ORAL at 15:28

## 2022-03-25 RX ADMIN — IBUPROFEN 800 MG: 400 TABLET, FILM COATED ORAL at 00:06

## 2022-03-25 RX ADMIN — IBUPROFEN 800 MG: 400 TABLET, FILM COATED ORAL at 07:38

## 2022-03-25 RX ADMIN — ACETAMINOPHEN 650 MG: 325 TABLET, FILM COATED ORAL at 00:06

## 2022-03-25 RX ADMIN — ACETAMINOPHEN 650 MG: 325 TABLET, FILM COATED ORAL at 21:28

## 2022-03-25 RX ADMIN — DOCUSATE SODIUM 100 MG: 100 CAPSULE, LIQUID FILLED ORAL at 07:38

## 2022-03-25 ASSESSMENT — ACTIVITIES OF DAILY LIVING (ADL)
ADLS_ACUITY_SCORE: 3

## 2022-03-25 NOTE — LACTATION NOTE
Initial Lactation visit. Hand out given. Recommend unlimited, frequent breast feedings: At least 8 - 12 times every 24 hours. Avoid pacifiers and supplementation with formula unless medically indicated. Explained benefits of holding baby skin on skin to help promote better breastfeeding outcomes.     Trupti states breastfeeding has been mostly attempts so far. She states her baby girl, Yajaira has had an uncoordinated suck. They introduced a pacifier last evening to help that and Yajaira was also able to take donor milk via a bottle today with OT. Encouraged Trupti to continue to work on skin to skin and latching infant with staff support. Recommended she pump after breastfeeding attempts as well to ensure adequate milk supply. Trupti and her  appreciative of my visit. Will revisit as needed.     Brie Walker RN IBCLC

## 2022-03-25 NOTE — PLAN OF CARE
Date/Time: March 25, 2022    Reason for Admission:vaginal delivery    End of shift summary: VSS. Fundus F/U, lochia scant. Periurethral laceration + repair.  Neuros intact. Denies N/V. Voiding spontaneously. Attempting breast feeding, infant has poor coordination and suck. Self expressing via syringe - attempted tube/syringe at breast and finger feeding with minimal participation via infant. Mom has started pumping and paci given to infant. Pain managed with tylenol and ibuprofen. Appears to be bonding well with infant. Support person, Wally, at bedside helping with cares to both mom and infant. Education completed and questions answered. Fasting BS 77.

## 2022-03-25 NOTE — PLAN OF CARE
Patient taking Tylenol and Ibuprofen for pain with adequate pain relief. Patient ambulating independently, voiding without difficulty, IV removed this AM. Patient pumping after attempting at breastfeeding. Encouraged patient to call with needs/questions. Call light within reach, will continue to monitor.

## 2022-03-25 NOTE — L&D DELIVERY NOTE
Delivery Date: 2022    ANTEPARTUM DIAGNOSES:  Intrauterine pregnancy, 38+ weeks' gestation, gestational diabetes, diet-controlled; spontaneous rupture of membranes with inadequate labor, group B strep negative, small fetal VSD.    POSTPARTUM DIAGNOSES:  Status post Pitocin augmented normal spontaneous vaginal delivery, infant female, 8 pounds 3 ounces, Apgars 7 and 8, spontaneous placental passage intact, bilateral periurethral lacerations, repaired.    PATIENT IDENTIFICATION:  Trupti Harvey is a 32-year-old  female,  1, para 0 at 38 weeks and 5 days' gestation, who presented to the [facility] assessment area for evaluation of spontaneous rupture of membranes just after midnight on 2022.  The patient was followed at our office since 10 weeks 6 days' gestation.  She had comprehensive and normal prenatal care.  Her weight gain was 24 pounds.  The pregnancy was achieved through in vitro fertilization.  The pregnancy was complicated by gestational diabetes that was diagnosed in the early third trimester, but was treated with diet alone.  A level 2 ultrasound also had shown a small VSD in the baby and this was confirmed on a fetal cardiac echo.    The patient's blood type is B positive, antibody screen negative, rubella titer showed immunity.  Hepatitis B, HIV, RPR, were all negative.  Chlamydia and gonorrhea DNA probes were negative at the initial prenatal visit.  A 1-hour glucose screen was elevated and a 3-hour GTT showed 2 abnormal values consistent with gestational diabetes.  The patient was evaluated by diabetes education and Endocrinology.  Initially, insulin was recommended to manage the patient's blood sugar levels; however, the patient did obtain a second opinion and the remainder of her pregnancy was managed with diet alone.  She did have a dating ultrasound at 10 weeks' gestation that confirmed her IVF transfer dates.  As noted above, the level 2 ultrasound was  normal other than the suggestion of a possible small VSD in the baby.  This was confirmed by a fetal echo.  Growth ultrasounds during the pregnancy were normal and biophysical profile testing after 36 weeks was also normal.  The patient was in her usual state of good health when she experienced spontaneous rupture of membranes at 2330 hours on 03/23/2022.  She notified our office and was instructed to present to the maternal assessment area for evaluation.    FIRST STAGE OF LABOR:  The patient was evaluated just after midnight, which was now 03/24/2022.  She was seen in the maternal assessment area and spontaneous rupture of membranes was confirmed.  The fetal heart rate tracing was normal, reassuring 135 baseline, moderate variability, accelerations were present, decelerations were absent.  The patient's initial blood sugar was 135.  Cervical examination disclosed that the cervix was 3-4 cm dilated, 80% effaced, vertex -1.  The patient was observed through the early morning hours of 03/24/2022.  She was deo intermittently, but quite irregularly and inadequately.  She was reevaluated by 0700 hours that morning and the cervix remained 3-4 cm, 80%, vertex -1.  Intravenous oxytocin was initiated to provide adequate labor.  The Pitocin was increased to provide good labor and reached a maximum of 8 milliunits per minute.  The patient's contractions increased in frequency and severity.  She did receive an epidural at 1439 on 03/24/2022.  The cervix at that time was 4 cm, 90%, vertex -1.  Fetal heart tones remained category 1.  The cervix progressed to 6 cm and finally to complete dilation at 1350 hours on 03/24/2022.  Fetal heart tones remained normal, the patient was comfortable and pushing was not immediately begun.    SECOND STAGE OF LABOR:  The patient became completely dilated at 1350 hours on 03/24/2022.  After a period of observation she began pushing at 1500 hours.  Fetal heart tones had remained normal  during the observation period and remained so during the second stage of labor.  The patient pushed effectively and brought the baby from the +3 to the +4 station.  The baby came to a full crown.    I was contacted to attend the delivery or at least stand by as Dr. Risa Liang was planning to deliver the patient.  However, the patient was pushing effectively and spontaneous contractions brought the baby to a full crown and to delivery.  At 1553 hours on 03/24/2022, the patient delivered a viable infant female, 8 pounds 3 ounces with Apgars of 7 and 8 at 1 and 5 minutes respectively.  The presentation was LALI and the baby's right hand was alongside the head.  There was no shoulder dystocia.  There was also no nuchal cord.  The baby was immediately placed on the maternal abdomen and the cord was noted to be somewhat short.  The mother and baby remained in the birthing room in excellent condition following delivery.  Auscultation of the baby's heart did suggest a murmur, consistent with the baby's known small VSD.    THIRD STAGE OF LABOR:  After a delayed cord clamping, which lasted approximately 90 seconds, the umbilical cord was doubly clamped and then ligated by the patient's .  Cord blood was collected.  After a 7-minute third stage of labor, the placenta delivered spontaneously intact with a 3-vessel cord.  Examination of the perineum found bilateral periurethral tears.  These were not bleeding, but were fairly deep and therefore were reapproximated using interrupted submucosal stitches of 3-0 Vicryl.  The perineum and the vagina were completely intact.  The quantitative blood loss for the delivery was 150 mL.  The mother and baby remained in excellent condition in the delivery room.  Intravenous oxytocin was being infused which resulted in excellent uterine tone.    DELIVERY SUMMARY:  1.  Pregnancy at 38+ weeks' gestation.  2.  Gestational diabetes, diet-controlled.  3.  IVF pregnancy.  4.  Small fetal  VSD.  5.  Spontaneous rupture of membranes.  6.  Group B strep negative.  7.  Status post Pitocin-augmented normal spontaneous vaginal delivery, infant female, 8 pounds 3 ounces, Apgars 7 and 8.  8.  Bilateral periurethral lacerations, repaired.  9.  Spontaneous placental passage intact, 3-vessel cord.  10.  Quantitative blood loss 150 mL.  11.  Excellent maternal and fetal status post delivery.   12.  Normal maternal blood sugars during labor without the need for intravenous insulin.      Tay Ngo MD        D: 2022   T: 2022   MT: [facility]    Name:     SAL CARLSON  MRN:      [id number]        Account:       [id number]   :      1990           Delivery Date: 2022     Document: [id number]    cc:  Tay Ngo MD

## 2022-03-25 NOTE — ANESTHESIA POSTPROCEDURE EVALUATION
Patient: Trupti Harvey    Procedure: * No procedures listed *       Anesthesia Type:  Epidural    Note:  Disposition: Inpatient   Postop Pain Control: Uneventful            Sign Out: Well controlled pain   PONV:    Neuro/Psych: Uneventful            Sign Out: Acceptable/Baseline neuro status   Airway/Respiratory: Uneventful            Sign Out: Acceptable/Baseline resp. status   CV/Hemodynamics: Uneventful            Sign Out: Acceptable CV status   Other NRE: NONE   DID A NON-ROUTINE EVENT OCCUR?     Event details/Postop Comments:  Patient evaluated after epidural follow-up and specifically denies severe headache, severe lower back pain, saddle anesthesia, loss of bowel/bladder function or other major complications.  Ambulating as expected.             Last vitals:  Vitals Value Taken Time   BP     Temp     Pulse     Resp     SpO2         Electronically Signed By: Tao Conde MD  March 25, 2022  6:50 PM

## 2022-03-25 NOTE — PROGRESS NOTES
Post-partum Note      S: Patient is doing well today.  Pain is controlled with PO medications.  Tolerating regular diet without nausea or vomiting.  Ambulating without dizziness.  Urinating without difficulty. Lochia normal.  Working breastfeeding, pumping/hand-expressing.    O:   Patient Vitals for the past 24 hrs:   BP Temp Temp src Pulse Resp SpO2   03/25/22 0500 106/60 -- -- 69 16 --   03/25/22 0000 102/61 98  F (36.7  C) -- 77 16 --   03/24/22 2050 116/74 98.5  F (36.9  C) Oral 79 16 --   03/24/22 1745 117/71 -- -- -- -- --   03/24/22 1730 116/64 -- -- -- -- --   03/24/22 1715 116/65 -- -- -- -- --   03/24/22 1700 118/60 -- -- -- -- --   03/24/22 1645 120/72 -- -- -- -- --   03/24/22 1630 120/71 -- -- -- -- --   03/24/22 1600 126/73 99.5  F (37.5  C) Temporal -- -- --   03/24/22 1445 127/75 -- -- -- -- 95 %   03/24/22 1430 129/77 -- -- -- -- 97 %   03/24/22 1415 -- 98.4  F (36.9  C) Temporal -- -- 96 %   03/24/22 1400 126/71 -- -- -- -- 99 %   03/24/22 1350 117/72 -- -- -- -- 100 %   03/24/22 1315 110/62 -- -- -- -- 98 %   03/24/22 1307 99/54 -- -- -- -- --   03/24/22 1252 101/55 -- -- -- -- --   03/24/22 1237 110/55 -- -- -- -- --   03/24/22 1222 107/59 -- -- -- -- --   03/24/22 1200 100/58 -- -- -- -- 99 %   03/24/22 1145 98/55 -- -- -- -- 99 %   03/24/22 1136 98/55 -- -- -- -- --   03/24/22 1130 99/58 -- -- -- -- 99 %   03/24/22 1100 110/62 -- -- -- -- 99 %   03/24/22 1045 112/64 -- -- -- -- 99 %   03/24/22 1030 113/65 -- -- -- -- 97 %   03/24/22 1015 115/63 -- -- -- -- 96 %   03/24/22 1012 118/65 -- -- -- -- --   03/24/22 1010 115/64 -- -- -- -- --   03/24/22 1008 125/66 -- -- -- -- --   03/24/22 1006 122/67 -- -- -- -- --   03/24/22 1004 125/69 -- -- -- -- 97 %   03/24/22 1001 124/72 -- -- -- -- --   03/24/22 0845 -- 97.9  F (36.6  C) -- -- -- --     Gen:  Resting comfortably, NAD  Pulm:  Breathing comfortably on room air  Abd:  Soft, appropriately ttp, non-distended.Fundus 2cm below umbilicus, firm and  non-tender.  Ext:  non-tender, trace bilateral LE edema    I/O last 3 completed shifts:  In: -   Out: 450 [Urine:300; Blood:150]    Hgb:   Hemoglobin   Date Value Ref Range Status   2022 12.6 11.7 - 15.7 g/dL Final       Assessment/Plan:  32 year old  on PPD #1 s/p .  1. Continue with routine postpartum management  2. GDMA1: FBG 77 this morning. Will need 2 hr GTT this morning.  3. Hgb pending this morning.  4. Rh: Positive  5. Feed: Breastfeeding/pumping/hand-expressing  Dispo: DC home tomorrow, PPD#2.     Risa Liang MD  [facility]  3/25/2022, 8:06 AM

## 2022-03-26 VITALS
RESPIRATION RATE: 16 BRPM | HEART RATE: 68 BPM | HEIGHT: 64 IN | BODY MASS INDEX: 29.53 KG/M2 | SYSTOLIC BLOOD PRESSURE: 125 MMHG | DIASTOLIC BLOOD PRESSURE: 81 MMHG | OXYGEN SATURATION: 95 % | TEMPERATURE: 98 F | WEIGHT: 173 LBS

## 2022-03-26 PROCEDURE — 250N000013 HC RX MED GY IP 250 OP 250 PS 637: Performed by: OBSTETRICS & GYNECOLOGY

## 2022-03-26 RX ADMIN — ACETAMINOPHEN 650 MG: 325 TABLET, FILM COATED ORAL at 15:28

## 2022-03-26 RX ADMIN — IBUPROFEN 800 MG: 400 TABLET, FILM COATED ORAL at 15:28

## 2022-03-26 RX ADMIN — IBUPROFEN 800 MG: 400 TABLET, FILM COATED ORAL at 03:18

## 2022-03-26 RX ADMIN — DOCUSATE SODIUM 100 MG: 100 CAPSULE, LIQUID FILLED ORAL at 09:23

## 2022-03-26 RX ADMIN — ACETAMINOPHEN 650 MG: 325 TABLET, FILM COATED ORAL at 09:23

## 2022-03-26 RX ADMIN — ACETAMINOPHEN 650 MG: 325 TABLET, FILM COATED ORAL at 03:18

## 2022-03-26 RX ADMIN — IBUPROFEN 800 MG: 400 TABLET, FILM COATED ORAL at 09:23

## 2022-03-26 ASSESSMENT — ACTIVITIES OF DAILY LIVING (ADL)
ADLS_ACUITY_SCORE: 3

## 2022-03-26 NOTE — PROGRESS NOTES
"OB Post-partum Note  PPD# 2    S:  Patient doing well.  Pain controlled.  Voiding.  Bleeding scant.  Breast feeding and pumping.  Baby will stay an extra day to work on feedings.  Pt will be discharged but will room in.  Reports BM yesterday without difficulty.      O:  /71 (BP Location: Left arm)   Pulse 66   Temp 97.9  F (36.6  C) (Oral)   Resp 16   Ht 1.626 m (5' 4\")   Wt 78.5 kg (173 lb)   SpO2 95%   Breastfeeding Unknown   BMI 29.70 kg/m    Gen- A&O, NAD  Abd- Non-tender, fundus firm at 2 below umbilicus  Ext- non-tender, neg edema    Hemoglobin   Date Value Ref Range Status   2022 11.9 11.7 - 15.7 g/dL Final     B POS  Rubella Immune    A/P: 32 year old  PPD# 2 s/p , breastfeeding    1.  Post partum warnings/precautions/instructions reviewed.  2.  OTC meds for discomfort  3.  Discharge home today, RTC in 6 weeks/prn    Yuko Plunkett CNM  3/26/2022  10:17 AM  "

## 2022-03-26 NOTE — PLAN OF CARE
D: VSS, assessments WDL.   I: Pt. received complete discharge paperwork and was given times of last dose for all discharge medications in writing on discharge medication sheets.  Discharge teaching included home medication, pain management, activity restrictions, postpartum cares, and signs and symptoms of infection.    A: Discharge outcomes on care plan met.  Mother states understanding and comfort with self and baby cares.  P: Pt. discharged to room in with baby staying to work on feedings. Home care referral sent.  Pt. to follow up with OB per MD order.  Pt. had no further questions at the time of discharge and no unmet needs were identified.

## 2022-03-26 NOTE — PLAN OF CARE
Vital signs stable. Postpartum assessment WDL. Pain controlled with tylenol and ibuprofen. Breastfeeding attempts with RN assist and breast shield. Pumping and bottle feeding . Patient and infant bonding well.  present and supportive. Plan to discharge today. Will continue with current plan of care.

## 2022-03-27 ENCOUNTER — LACTATION ENCOUNTER (OUTPATIENT)
Age: 32
End: 2022-03-27

## 2022-03-27 ENCOUNTER — HEALTH MAINTENANCE LETTER (OUTPATIENT)
Age: 32
End: 2022-03-27

## 2022-03-27 NOTE — LACTATION NOTE
"This note was copied from a baby's chart.  Follow up Lactation visit with Trupti (mother), Wally (father), & baby girl  Yajaira. Getting ready for discharge. Baby Yajaira has been improving with KAITLYN bottle, taking EBM & formula. Trupti is pumping with each feeding and getting increasing volumes with pump sessions! She's been working on breastfeeding but tends to hold nipple shield in mouth with no active suck. Offered support and encouragement. Discussed feeding goals with Yajaira: 1)Feed baby, 2) Remove milk from breasts regularly with pumping and hand expression, & 3) Keep experiences at breast positive.      LC assisted to wake baby for feeding and attempt at the breast. Tried 20mm nipple shield to mimic smaller size of bottle nipple. Initially appeared to take a few suck bursts and latch deeply, then had a spit-up and just held nipple shield in mouth. Encouraged ending breastfeed attempt with skin to skin and moving to bottle feed in a short time, for energy conservation. Reinforced the \"win\" of baby being willing to latch. Reviewed general positioning advice for breastfeeding. 20mm shield appears small for Trupti's nipple, but a better fit for Yajaira's mouth at this point, so reviewed importance of getting deep latches if baby Yajaira is nursing and importance of keeping nursing comfortable. Stressed importance of close lactation follow up in clinic.    Discussed satiety cues, feeding cues, and reviewed Feeding Log for home use. Encouraged to review Breastfeeding section in Your Guide to Postpartum & [place] [facility].    Reviewed milk supply and engorgement. Reviewed typical timeline of milk supply initiation and progression over first 3-5 days postpartum. Discussed comfort measures for engorgement, plugged duct treatment, and warning signs of breast infection.      Feeding plan: Recommend frequent breast feedings: At least 8 - 12 times every 24 hours. Keep breastfeeding time to 10-15 minutes maximum, then bottle feed " with EBM/formula after each feeding. Trupti to pump with each feeding. Encouraged use of feeding log and to record feedings, and void/stool patterns. Trupti has a breast pump for home use. Follow up with [facility], and encouraged seeing Lactation in clinic within the week and then at least weekly until breastfeeding is well established.. Reviewed outpatient lactation resources. Trupti & Wally very appreciative of visit.    Fide Nelson RN-C, IBCLC, MNN, PHN, BSN

## 2022-09-25 ENCOUNTER — HEALTH MAINTENANCE LETTER (OUTPATIENT)
Age: 32
End: 2022-09-25

## 2023-05-08 ENCOUNTER — HEALTH MAINTENANCE LETTER (OUTPATIENT)
Age: 33
End: 2023-05-08

## 2023-06-30 ENCOUNTER — LAB REQUISITION (OUTPATIENT)
Dept: LAB | Facility: CLINIC | Age: 33
End: 2023-06-30

## 2023-06-30 DIAGNOSIS — Z01.419 ENCOUNTER FOR GYNECOLOGICAL EXAMINATION (GENERAL) (ROUTINE) WITHOUT ABNORMAL FINDINGS: ICD-10-CM

## 2023-06-30 PROCEDURE — 87624 HPV HI-RISK TYP POOLED RSLT: CPT | Performed by: OBSTETRICS & GYNECOLOGY

## 2023-06-30 PROCEDURE — G0145 SCR C/V CYTO,THINLAYER,RESCR: HCPCS | Performed by: OBSTETRICS & GYNECOLOGY

## 2023-07-06 ENCOUNTER — LAB REQUISITION (OUTPATIENT)
Dept: LAB | Facility: CLINIC | Age: 33
End: 2023-07-06

## 2023-07-06 ENCOUNTER — HOSPITAL ENCOUNTER (OUTPATIENT)
Facility: CLINIC | Age: 33
Discharge: HOME | End: 2023-07-06
Admitting: OBSTETRICS & GYNECOLOGY
Payer: COMMERCIAL

## 2023-07-06 DIAGNOSIS — Z13.29 ENCOUNTER FOR SCREENING FOR OTHER SUSPECTED ENDOCRINE DISORDER: ICD-10-CM

## 2023-07-06 DIAGNOSIS — N97.8 FEMALE INFERTILITY OF OTHER ORIGIN: ICD-10-CM

## 2023-07-06 LAB
HOLD SPECIMEN: NORMAL
MIS SERPL-MCNC: 2.58 NG/ML (ref 0.58–8.1)
TSH SERPL DL<=0.005 MIU/L-ACNC: 1.76 UIU/ML (ref 0.3–4.2)

## 2023-07-06 PROCEDURE — 83520 IMMUNOASSAY QUANT NOS NONAB: CPT | Performed by: OBSTETRICS & GYNECOLOGY

## 2023-07-06 PROCEDURE — 84443 ASSAY THYROID STIM HORMONE: CPT | Performed by: OBSTETRICS & GYNECOLOGY

## 2023-07-07 LAB
BKR LAB AP GYN ADEQUACY: NORMAL
BKR LAB AP GYN INTERPRETATION: NORMAL
BKR LAB AP HPV REFLEX: NORMAL
BKR LAB AP LMP: NORMAL
BKR LAB AP PREVIOUS ABNL DX: NORMAL
BKR LAB AP PREVIOUS ABNORMAL: NORMAL
PATH REPORT.COMMENTS IMP SPEC: NORMAL
PATH REPORT.COMMENTS IMP SPEC: NORMAL
PATH REPORT.RELEVANT HX SPEC: NORMAL

## 2023-07-10 LAB
HUMAN PAPILLOMA VIRUS 16 DNA: NEGATIVE
HUMAN PAPILLOMA VIRUS 18 DNA: NEGATIVE
HUMAN PAPILLOMA VIRUS FINAL DIAGNOSIS: NORMAL
HUMAN PAPILLOMA VIRUS OTHER HR: NEGATIVE

## 2023-09-13 ENCOUNTER — LAB REQUISITION (OUTPATIENT)
Dept: LAB | Facility: CLINIC | Age: 33
End: 2023-09-13

## 2023-09-13 DIAGNOSIS — Z32.01 ENCOUNTER FOR PREGNANCY TEST, RESULT POSITIVE: ICD-10-CM

## 2023-09-13 LAB — HCG INTACT+B SERPL-ACNC: 256 MIU/ML

## 2023-09-13 PROCEDURE — 84702 CHORIONIC GONADOTROPIN TEST: CPT | Performed by: OBSTETRICS & GYNECOLOGY

## 2023-09-15 ENCOUNTER — LAB REQUISITION (OUTPATIENT)
Dept: LAB | Facility: CLINIC | Age: 33
End: 2023-09-15

## 2023-09-15 DIAGNOSIS — Z87.42 PERSONAL HISTORY OF OTHER DISEASES OF THE FEMALE GENITAL TRACT: ICD-10-CM

## 2023-09-15 LAB — HCG INTACT+B SERPL-ACNC: 681 MIU/ML

## 2023-09-15 PROCEDURE — 84702 CHORIONIC GONADOTROPIN TEST: CPT | Performed by: OBSTETRICS & GYNECOLOGY

## 2023-10-30 ENCOUNTER — LAB REQUISITION (OUTPATIENT)
Dept: LAB | Facility: CLINIC | Age: 33
End: 2023-10-30

## 2023-10-30 DIAGNOSIS — Z34.81 ENCOUNTER FOR SUPERVISION OF OTHER NORMAL PREGNANCY, FIRST TRIMESTER: ICD-10-CM

## 2023-10-30 LAB
ABO/RH(D): NORMAL
ANTIBODY SCREEN: NEGATIVE
BASOPHILS # BLD AUTO: 0 10E3/UL (ref 0–0.2)
BASOPHILS NFR BLD AUTO: 0 %
EOSINOPHIL # BLD AUTO: 0.1 10E3/UL (ref 0–0.7)
EOSINOPHIL NFR BLD AUTO: 1 %
ERYTHROCYTE [DISTWIDTH] IN BLOOD BY AUTOMATED COUNT: 12.8 % (ref 10–15)
HCT VFR BLD AUTO: 36.2 % (ref 35–47)
HGB BLD-MCNC: 12.2 G/DL (ref 11.7–15.7)
IMM GRANULOCYTES # BLD: 0 10E3/UL
IMM GRANULOCYTES NFR BLD: 0 %
LYMPHOCYTES # BLD AUTO: 2 10E3/UL (ref 0.8–5.3)
LYMPHOCYTES NFR BLD AUTO: 22 %
MCH RBC QN AUTO: 31.6 PG (ref 26.5–33)
MCHC RBC AUTO-ENTMCNC: 33.7 G/DL (ref 31.5–36.5)
MCV RBC AUTO: 94 FL (ref 78–100)
MONOCYTES # BLD AUTO: 0.6 10E3/UL (ref 0–1.3)
MONOCYTES NFR BLD AUTO: 7 %
NEUTROPHILS # BLD AUTO: 6.2 10E3/UL (ref 1.6–8.3)
NEUTROPHILS NFR BLD AUTO: 70 %
NRBC # BLD AUTO: 0 10E3/UL
NRBC BLD AUTO-RTO: 0 /100
PLATELET # BLD AUTO: 257 10E3/UL (ref 150–450)
RBC # BLD AUTO: 3.86 10E6/UL (ref 3.8–5.2)
SPECIMEN EXPIRATION DATE: NORMAL
WBC # BLD AUTO: 8.9 10E3/UL (ref 4–11)

## 2023-10-30 PROCEDURE — 87086 URINE CULTURE/COLONY COUNT: CPT | Performed by: OBSTETRICS & GYNECOLOGY

## 2023-10-30 PROCEDURE — 85025 COMPLETE CBC W/AUTO DIFF WBC: CPT | Performed by: OBSTETRICS & GYNECOLOGY

## 2023-10-30 PROCEDURE — 86762 RUBELLA ANTIBODY: CPT | Performed by: OBSTETRICS & GYNECOLOGY

## 2023-10-30 PROCEDURE — 86803 HEPATITIS C AB TEST: CPT | Performed by: OBSTETRICS & GYNECOLOGY

## 2023-10-30 PROCEDURE — 87389 HIV-1 AG W/HIV-1&-2 AB AG IA: CPT | Performed by: OBSTETRICS & GYNECOLOGY

## 2023-10-30 PROCEDURE — 87340 HEPATITIS B SURFACE AG IA: CPT | Performed by: OBSTETRICS & GYNECOLOGY

## 2023-10-30 PROCEDURE — 86901 BLOOD TYPING SEROLOGIC RH(D): CPT | Performed by: OBSTETRICS & GYNECOLOGY

## 2023-10-30 PROCEDURE — 87591 N.GONORRHOEAE DNA AMP PROB: CPT | Performed by: OBSTETRICS & GYNECOLOGY

## 2023-10-30 PROCEDURE — 86850 RBC ANTIBODY SCREEN: CPT | Performed by: OBSTETRICS & GYNECOLOGY

## 2023-10-30 PROCEDURE — 87491 CHLMYD TRACH DNA AMP PROBE: CPT | Performed by: OBSTETRICS & GYNECOLOGY

## 2023-10-30 PROCEDURE — 86592 SYPHILIS TEST NON-TREP QUAL: CPT | Performed by: OBSTETRICS & GYNECOLOGY

## 2023-10-31 LAB
C TRACH DNA SPEC QL PROBE+SIG AMP: NEGATIVE
HBV SURFACE AG SERPL QL IA: NONREACTIVE
HCV AB SERPL QL IA: NONREACTIVE
HIV 1+2 AB+HIV1 P24 AG SERPL QL IA: NONREACTIVE
N GONORRHOEA DNA SPEC QL NAA+PROBE: NEGATIVE
RPR SER QL: NONREACTIVE
RUBV IGG SERPL QL IA: 6.45 INDEX
RUBV IGG SERPL QL IA: POSITIVE

## 2023-11-01 LAB — BACTERIA UR CULT: NORMAL

## 2023-11-09 ENCOUNTER — TRANSFERRED RECORDS (OUTPATIENT)
Dept: HEALTH INFORMATION MANAGEMENT | Facility: CLINIC | Age: 33
End: 2023-11-09
Payer: COMMERCIAL

## 2023-11-10 ENCOUNTER — TRANSCRIBE ORDERS (OUTPATIENT)
Dept: MATERNAL FETAL MEDICINE | Facility: CLINIC | Age: 33
End: 2023-11-10
Payer: COMMERCIAL

## 2023-11-10 DIAGNOSIS — O26.90 PREGNANCY RELATED CONDITION, ANTEPARTUM: Primary | ICD-10-CM

## 2023-12-15 ENCOUNTER — PRE VISIT (OUTPATIENT)
Dept: MATERNAL FETAL MEDICINE | Facility: CLINIC | Age: 33
End: 2023-12-15
Payer: COMMERCIAL

## 2023-12-26 ENCOUNTER — HOSPITAL ENCOUNTER (OUTPATIENT)
Dept: ULTRASOUND IMAGING | Facility: CLINIC | Age: 33
Discharge: HOME | End: 2023-12-26
Attending: OBSTETRICS & GYNECOLOGY
Payer: COMMERCIAL

## 2023-12-26 ENCOUNTER — OFFICE VISIT (OUTPATIENT)
Dept: MATERNAL FETAL MEDICINE | Facility: CLINIC | Age: 33
End: 2023-12-26
Attending: OBSTETRICS & GYNECOLOGY
Payer: COMMERCIAL

## 2023-12-26 DIAGNOSIS — O26.90 PREGNANCY RELATED CONDITION, ANTEPARTUM: ICD-10-CM

## 2023-12-26 DIAGNOSIS — O35.2XX0 PREVIOUS CHILD BORN WITH VENTRICULAR SEPTAL DEFECT, CURRENTLY PREGNANT, SINGLE OR UNSPECIFIED FETUS: Primary | ICD-10-CM

## 2023-12-26 PROCEDURE — 76811 OB US DETAILED SNGL FETUS: CPT | Mod: 26 | Performed by: OBSTETRICS & GYNECOLOGY

## 2023-12-26 PROCEDURE — 76811 OB US DETAILED SNGL FETUS: CPT

## 2023-12-26 NOTE — PROGRESS NOTES
"Please see \"Imaging\" tab under \"Chart Review\" for details of today's visit.    Sangeeta Santos MD PhD  Maternal Fetal Medicine     "

## 2024-01-17 ENCOUNTER — HOSPITAL ENCOUNTER (OUTPATIENT)
Dept: ULTRASOUND IMAGING | Facility: CLINIC | Age: 34
Discharge: HOME | End: 2024-01-17
Attending: OBSTETRICS & GYNECOLOGY
Payer: COMMERCIAL

## 2024-01-17 ENCOUNTER — OFFICE VISIT (OUTPATIENT)
Dept: MATERNAL FETAL MEDICINE | Facility: CLINIC | Age: 34
End: 2024-01-17
Attending: OBSTETRICS & GYNECOLOGY
Payer: COMMERCIAL

## 2024-01-17 DIAGNOSIS — O35.2XX0 PREVIOUS CHILD BORN WITH VENTRICULAR SEPTAL DEFECT, CURRENTLY PREGNANT, SINGLE OR UNSPECIFIED FETUS: Primary | ICD-10-CM

## 2024-01-17 DIAGNOSIS — O35.2XX0 PREVIOUS CHILD BORN WITH VENTRICULAR SEPTAL DEFECT, CURRENTLY PREGNANT, SINGLE OR UNSPECIFIED FETUS: ICD-10-CM

## 2024-01-17 PROCEDURE — 76827 ECHO EXAM OF FETAL HEART: CPT | Mod: 26 | Performed by: OBSTETRICS & GYNECOLOGY

## 2024-01-17 PROCEDURE — 93325 DOPPLER ECHO COLOR FLOW MAPG: CPT

## 2024-01-17 PROCEDURE — 93325 DOPPLER ECHO COLOR FLOW MAPG: CPT | Mod: 26 | Performed by: OBSTETRICS & GYNECOLOGY

## 2024-01-17 PROCEDURE — 76825 ECHO EXAM OF FETAL HEART: CPT | Mod: 26 | Performed by: OBSTETRICS & GYNECOLOGY

## 2024-01-17 NOTE — PROGRESS NOTES
"Please see \"Imaging\" tab under \"Chart Review\" for details of today's US at the [facility].    Fidel Durand MD  Maternal-Fetal Medicine    "

## 2024-02-29 ENCOUNTER — LAB REQUISITION (OUTPATIENT)
Dept: LAB | Facility: CLINIC | Age: 34
End: 2024-02-29

## 2024-02-29 DIAGNOSIS — Z36.89 ENCOUNTER FOR OTHER SPECIFIED ANTENATAL SCREENING: ICD-10-CM

## 2024-02-29 LAB
ERYTHROCYTE [DISTWIDTH] IN BLOOD BY AUTOMATED COUNT: 13.2 % (ref 10–15)
HCT VFR BLD AUTO: 36.2 % (ref 35–47)
HGB BLD-MCNC: 12.4 G/DL (ref 11.7–15.7)
MCH RBC QN AUTO: 32.2 PG (ref 26.5–33)
MCHC RBC AUTO-ENTMCNC: 34.3 G/DL (ref 31.5–36.5)
MCV RBC AUTO: 94 FL (ref 78–100)
PLATELET # BLD AUTO: 244 10E3/UL (ref 150–450)
RBC # BLD AUTO: 3.85 10E6/UL (ref 3.8–5.2)
WBC # BLD AUTO: 10.7 10E3/UL (ref 4–11)

## 2024-02-29 PROCEDURE — 85027 COMPLETE CBC AUTOMATED: CPT | Performed by: OBSTETRICS & GYNECOLOGY

## 2024-02-29 PROCEDURE — 86592 SYPHILIS TEST NON-TREP QUAL: CPT | Performed by: OBSTETRICS & GYNECOLOGY

## 2024-03-01 LAB — RPR SER QL: NONREACTIVE

## 2024-04-25 ENCOUNTER — LAB REQUISITION (OUTPATIENT)
Dept: LAB | Facility: CLINIC | Age: 34
End: 2024-04-25
Payer: COMMERCIAL

## 2024-04-25 DIAGNOSIS — Z3A.36 36 WEEKS GESTATION OF PREGNANCY: ICD-10-CM

## 2024-04-25 PROCEDURE — 87653 STREP B DNA AMP PROBE: CPT | Mod: ORL | Performed by: OBSTETRICS & GYNECOLOGY

## 2024-04-26 LAB — GP B STREP DNA SPEC QL NAA+PROBE: POSITIVE

## 2024-05-01 ENCOUNTER — HOSPITAL ENCOUNTER (INPATIENT)
Facility: CLINIC | Age: 34
LOS: 2 days | Discharge: HOME | End: 2024-05-03
Attending: OBSTETRICS & GYNECOLOGY | Admitting: OBSTETRICS & GYNECOLOGY
Payer: COMMERCIAL

## 2024-05-01 ENCOUNTER — ANESTHESIA (OUTPATIENT)
Dept: OBGYN | Facility: CLINIC | Age: 34
End: 2024-05-01
Payer: COMMERCIAL

## 2024-05-01 ENCOUNTER — ANESTHESIA EVENT (OUTPATIENT)
Dept: OBGYN | Facility: CLINIC | Age: 34
End: 2024-05-01
Payer: COMMERCIAL

## 2024-05-01 DIAGNOSIS — O24.419 GESTATIONAL DIABETES MELLITUS (GDM), ANTEPARTUM, GESTATIONAL DIABETES METHOD OF CONTROL UNSPECIFIED: ICD-10-CM

## 2024-05-01 PROBLEM — Z34.90 PREGNANCY: Status: ACTIVE | Noted: 2024-05-01

## 2024-05-01 LAB
ABO/RH(D): NORMAL
ANTIBODY SCREEN: NEGATIVE
GLUCOSE BLDC GLUCOMTR-MCNC: 100 MG/DL (ref 70–99)
GLUCOSE BLDC GLUCOMTR-MCNC: 107 MG/DL (ref 70–99)
GLUCOSE BLDC GLUCOMTR-MCNC: 116 MG/DL (ref 70–99)
GLUCOSE BLDC GLUCOMTR-MCNC: 156 MG/DL (ref 70–99)
GLUCOSE BLDC GLUCOMTR-MCNC: 60 MG/DL (ref 70–99)
GLUCOSE BLDC GLUCOMTR-MCNC: 99 MG/DL (ref 70–99)
HGB BLD-MCNC: 11.9 G/DL (ref 11.7–15.7)
PLATELET # BLD AUTO: 170 10E3/UL (ref 150–450)
SPECIMEN EXPIRATION DATE: NORMAL
T PALLIDUM AB SER QL: NONREACTIVE

## 2024-05-01 PROCEDURE — 00HU33Z INSERTION OF INFUSION DEVICE INTO SPINAL CANAL, PERCUTANEOUS APPROACH: ICD-10-PCS | Performed by: ANESTHESIOLOGY

## 2024-05-01 PROCEDURE — 85049 AUTOMATED PLATELET COUNT: CPT | Performed by: OBSTETRICS & GYNECOLOGY

## 2024-05-01 PROCEDURE — 250N000011 HC RX IP 250 OP 636: Performed by: ANESTHESIOLOGY

## 2024-05-01 PROCEDURE — 3E0R3BZ INTRODUCTION OF ANESTHETIC AGENT INTO SPINAL CANAL, PERCUTANEOUS APPROACH: ICD-10-PCS | Performed by: ANESTHESIOLOGY

## 2024-05-01 PROCEDURE — 722N000001 HC LABOR CARE VAGINAL DELIVERY SINGLE

## 2024-05-01 PROCEDURE — 59400 OBSTETRICAL CARE: CPT | Performed by: ANESTHESIOLOGY

## 2024-05-01 PROCEDURE — 10907ZC DRAINAGE OF AMNIOTIC FLUID, THERAPEUTIC FROM PRODUCTS OF CONCEPTION, VIA NATURAL OR ARTIFICIAL OPENING: ICD-10-PCS | Performed by: OBSTETRICS & GYNECOLOGY

## 2024-05-01 PROCEDURE — 370N000003 HC ANESTHESIA WARD SERVICE: Performed by: ANESTHESIOLOGY

## 2024-05-01 PROCEDURE — 120N000012 HC R&B POSTPARTUM

## 2024-05-01 PROCEDURE — 250N000009 HC RX 250: Performed by: OBSTETRICS & GYNECOLOGY

## 2024-05-01 PROCEDURE — 3E033VJ INTRODUCTION OF OTHER HORMONE INTO PERIPHERAL VEIN, PERCUTANEOUS APPROACH: ICD-10-PCS | Performed by: OBSTETRICS & GYNECOLOGY

## 2024-05-01 PROCEDURE — 258N000003 HC RX IP 258 OP 636: Performed by: OBSTETRICS & GYNECOLOGY

## 2024-05-01 PROCEDURE — 85018 HEMOGLOBIN: CPT | Performed by: OBSTETRICS & GYNECOLOGY

## 2024-05-01 PROCEDURE — 250N000013 HC RX MED GY IP 250 OP 250 PS 637: Performed by: OBSTETRICS & GYNECOLOGY

## 2024-05-01 PROCEDURE — 250N000011 HC RX IP 250 OP 636: Mod: JZ | Performed by: OBSTETRICS & GYNECOLOGY

## 2024-05-01 PROCEDURE — 86780 TREPONEMA PALLIDUM: CPT | Performed by: OBSTETRICS & GYNECOLOGY

## 2024-05-01 PROCEDURE — 86900 BLOOD TYPING SEROLOGIC ABO: CPT | Performed by: OBSTETRICS & GYNECOLOGY

## 2024-05-01 PROCEDURE — 36415 COLL VENOUS BLD VENIPUNCTURE: CPT | Performed by: OBSTETRICS & GYNECOLOGY

## 2024-05-01 RX ORDER — OXYTOCIN 10 [USP'U]/ML
10 INJECTION, SOLUTION INTRAMUSCULAR; INTRAVENOUS
Status: DISCONTINUED | OUTPATIENT
Start: 2024-05-01 | End: 2024-05-03 | Stop reason: HOSPADM

## 2024-05-01 RX ORDER — DEXTROSE MONOHYDRATE 100 MG/ML
INJECTION, SOLUTION INTRAVENOUS CONTINUOUS PRN
Status: DISCONTINUED | OUTPATIENT
Start: 2024-05-01 | End: 2024-05-03 | Stop reason: HOSPADM

## 2024-05-01 RX ORDER — MISOPROSTOL 200 UG/1
400 TABLET ORAL
Status: DISCONTINUED | OUTPATIENT
Start: 2024-05-01 | End: 2024-05-01 | Stop reason: HOSPADM

## 2024-05-01 RX ORDER — CITRIC ACID/SODIUM CITRATE 334-500MG
30 SOLUTION, ORAL ORAL
Status: DISCONTINUED | OUTPATIENT
Start: 2024-05-01 | End: 2024-05-01 | Stop reason: HOSPADM

## 2024-05-01 RX ORDER — PENICILLIN G 3000000 [IU]/50ML
3 INJECTION, SOLUTION INTRAVENOUS EVERY 4 HOURS
Status: DISCONTINUED | OUTPATIENT
Start: 2024-05-01 | End: 2024-05-01 | Stop reason: HOSPADM

## 2024-05-01 RX ORDER — METOCLOPRAMIDE 10 MG/1
10 TABLET ORAL EVERY 6 HOURS PRN
Status: DISCONTINUED | OUTPATIENT
Start: 2024-05-01 | End: 2024-05-01 | Stop reason: HOSPADM

## 2024-05-01 RX ORDER — LIDOCAINE 40 MG/G
CREAM TOPICAL
Status: DISCONTINUED | OUTPATIENT
Start: 2024-05-01 | End: 2024-05-01 | Stop reason: HOSPADM

## 2024-05-01 RX ORDER — OXYTOCIN/0.9 % SODIUM CHLORIDE 30/500 ML
340 PLASTIC BAG, INJECTION (ML) INTRAVENOUS CONTINUOUS PRN
Status: DISCONTINUED | OUTPATIENT
Start: 2024-05-01 | End: 2024-05-03 | Stop reason: HOSPADM

## 2024-05-01 RX ORDER — OXYTOCIN/0.9 % SODIUM CHLORIDE 30/500 ML
100-340 PLASTIC BAG, INJECTION (ML) INTRAVENOUS CONTINUOUS PRN
Status: DISCONTINUED | OUTPATIENT
Start: 2024-05-01 | End: 2024-05-01

## 2024-05-01 RX ORDER — DEXTROSE MONOHYDRATE 25 G/50ML
25-50 INJECTION, SOLUTION INTRAVENOUS
Status: DISCONTINUED | OUTPATIENT
Start: 2024-05-01 | End: 2024-05-01

## 2024-05-01 RX ORDER — NALOXONE HYDROCHLORIDE 0.4 MG/ML
0.2 INJECTION, SOLUTION INTRAMUSCULAR; INTRAVENOUS; SUBCUTANEOUS
Status: DISCONTINUED | OUTPATIENT
Start: 2024-05-01 | End: 2024-05-01 | Stop reason: HOSPADM

## 2024-05-01 RX ORDER — MISOPROSTOL 200 UG/1
400 TABLET ORAL
Status: DISCONTINUED | OUTPATIENT
Start: 2024-05-01 | End: 2024-05-03 | Stop reason: HOSPADM

## 2024-05-01 RX ORDER — LOPERAMIDE HCL 2 MG
2 CAPSULE ORAL
Status: DISCONTINUED | OUTPATIENT
Start: 2024-05-01 | End: 2024-05-03 | Stop reason: HOSPADM

## 2024-05-01 RX ORDER — ACETAMINOPHEN 325 MG/1
650 TABLET ORAL EVERY 4 HOURS PRN
Status: DISCONTINUED | OUTPATIENT
Start: 2024-05-01 | End: 2024-05-01 | Stop reason: HOSPADM

## 2024-05-01 RX ORDER — BISACODYL 10 MG
10 SUPPOSITORY, RECTAL RECTAL DAILY PRN
Status: DISCONTINUED | OUTPATIENT
Start: 2024-05-01 | End: 2024-05-03 | Stop reason: HOSPADM

## 2024-05-01 RX ORDER — CARBOPROST TROMETHAMINE 250 UG/ML
250 INJECTION, SOLUTION INTRAMUSCULAR
Status: DISCONTINUED | OUTPATIENT
Start: 2024-05-01 | End: 2024-05-03 | Stop reason: HOSPADM

## 2024-05-01 RX ORDER — PROCHLORPERAZINE 25 MG
25 SUPPOSITORY, RECTAL RECTAL EVERY 12 HOURS PRN
Status: DISCONTINUED | OUTPATIENT
Start: 2024-05-01 | End: 2024-05-01 | Stop reason: HOSPADM

## 2024-05-01 RX ORDER — METOCLOPRAMIDE HYDROCHLORIDE 5 MG/ML
10 INJECTION INTRAMUSCULAR; INTRAVENOUS EVERY 6 HOURS PRN
Status: DISCONTINUED | OUTPATIENT
Start: 2024-05-01 | End: 2024-05-01 | Stop reason: HOSPADM

## 2024-05-01 RX ORDER — TRANEXAMIC ACID 10 MG/ML
1 INJECTION, SOLUTION INTRAVENOUS EVERY 30 MIN PRN
Status: DISCONTINUED | OUTPATIENT
Start: 2024-05-01 | End: 2024-05-03 | Stop reason: HOSPADM

## 2024-05-01 RX ORDER — IBUPROFEN 400 MG/1
800 TABLET, FILM COATED ORAL EVERY 6 HOURS PRN
Status: DISCONTINUED | OUTPATIENT
Start: 2024-05-01 | End: 2024-05-03 | Stop reason: HOSPADM

## 2024-05-01 RX ORDER — MISOPROSTOL 200 UG/1
800 TABLET ORAL
Status: DISCONTINUED | OUTPATIENT
Start: 2024-05-01 | End: 2024-05-03 | Stop reason: HOSPADM

## 2024-05-01 RX ORDER — ACETAMINOPHEN 325 MG/1
650 TABLET ORAL EVERY 4 HOURS PRN
Status: DISCONTINUED | OUTPATIENT
Start: 2024-05-01 | End: 2024-05-03 | Stop reason: HOSPADM

## 2024-05-01 RX ORDER — FENTANYL CITRATE 50 UG/ML
100 INJECTION, SOLUTION INTRAMUSCULAR; INTRAVENOUS
Status: DISCONTINUED | OUTPATIENT
Start: 2024-05-01 | End: 2024-05-01 | Stop reason: HOSPADM

## 2024-05-01 RX ORDER — MISOPROSTOL 200 UG/1
800 TABLET ORAL
Status: DISCONTINUED | OUTPATIENT
Start: 2024-05-01 | End: 2024-05-01 | Stop reason: HOSPADM

## 2024-05-01 RX ORDER — OXYTOCIN/0.9 % SODIUM CHLORIDE 30/500 ML
1-24 PLASTIC BAG, INJECTION (ML) INTRAVENOUS CONTINUOUS
Status: DISCONTINUED | OUTPATIENT
Start: 2024-05-01 | End: 2024-05-01 | Stop reason: HOSPADM

## 2024-05-01 RX ORDER — SODIUM CHLORIDE, SODIUM LACTATE, POTASSIUM CHLORIDE, CALCIUM CHLORIDE 600; 310; 30; 20 MG/100ML; MG/100ML; MG/100ML; MG/100ML
INJECTION, SOLUTION INTRAVENOUS CONTINUOUS PRN
Status: DISCONTINUED | OUTPATIENT
Start: 2024-05-01 | End: 2024-05-01 | Stop reason: HOSPADM

## 2024-05-01 RX ORDER — ROPIVACAINE HYDROCHLORIDE 2 MG/ML
10 INJECTION, SOLUTION EPIDURAL; INFILTRATION; PERINEURAL ONCE
Status: DISCONTINUED | OUTPATIENT
Start: 2024-05-01 | End: 2024-05-01 | Stop reason: HOSPADM

## 2024-05-01 RX ORDER — LOPERAMIDE HCL 2 MG
4 CAPSULE ORAL
Status: DISCONTINUED | OUTPATIENT
Start: 2024-05-01 | End: 2024-05-03 | Stop reason: HOSPADM

## 2024-05-01 RX ORDER — HYDROCORTISONE 25 MG/G
CREAM TOPICAL 3 TIMES DAILY PRN
Status: DISCONTINUED | OUTPATIENT
Start: 2024-05-01 | End: 2024-05-03 | Stop reason: HOSPADM

## 2024-05-01 RX ORDER — KETOROLAC TROMETHAMINE 30 MG/ML
30 INJECTION, SOLUTION INTRAMUSCULAR; INTRAVENOUS
Status: DISCONTINUED | OUTPATIENT
Start: 2024-05-01 | End: 2024-05-01

## 2024-05-01 RX ORDER — METHYLERGONOVINE MALEATE 0.2 MG/ML
200 INJECTION INTRAVENOUS
Status: DISCONTINUED | OUTPATIENT
Start: 2024-05-01 | End: 2024-05-01 | Stop reason: HOSPADM

## 2024-05-01 RX ORDER — NALOXONE HYDROCHLORIDE 0.4 MG/ML
0.4 INJECTION, SOLUTION INTRAMUSCULAR; INTRAVENOUS; SUBCUTANEOUS
Status: DISCONTINUED | OUTPATIENT
Start: 2024-05-01 | End: 2024-05-01 | Stop reason: HOSPADM

## 2024-05-01 RX ORDER — PROCHLORPERAZINE MALEATE 5 MG
10 TABLET ORAL EVERY 6 HOURS PRN
Status: DISCONTINUED | OUTPATIENT
Start: 2024-05-01 | End: 2024-05-01 | Stop reason: HOSPADM

## 2024-05-01 RX ORDER — METHYLERGONOVINE MALEATE 0.2 MG/ML
200 INJECTION INTRAVENOUS
Status: DISCONTINUED | OUTPATIENT
Start: 2024-05-01 | End: 2024-05-03 | Stop reason: HOSPADM

## 2024-05-01 RX ORDER — DOCUSATE SODIUM 100 MG/1
100 CAPSULE, LIQUID FILLED ORAL DAILY
Status: DISCONTINUED | OUTPATIENT
Start: 2024-05-01 | End: 2024-05-03 | Stop reason: HOSPADM

## 2024-05-01 RX ORDER — DEXTROSE MONOHYDRATE 25 G/50ML
25-50 INJECTION, SOLUTION INTRAVENOUS
Status: DISCONTINUED | OUTPATIENT
Start: 2024-05-01 | End: 2024-05-03 | Stop reason: HOSPADM

## 2024-05-01 RX ORDER — ONDANSETRON 4 MG/1
4 TABLET, ORALLY DISINTEGRATING ORAL EVERY 6 HOURS PRN
Status: DISCONTINUED | OUTPATIENT
Start: 2024-05-01 | End: 2024-05-01 | Stop reason: HOSPADM

## 2024-05-01 RX ORDER — ROPIVACAINE HYDROCHLORIDE 2 MG/ML
INJECTION, SOLUTION EPIDURAL; INFILTRATION; PERINEURAL
Status: COMPLETED | OUTPATIENT
Start: 2024-05-01 | End: 2024-05-01

## 2024-05-01 RX ORDER — MODIFIED LANOLIN
OINTMENT (GRAM) TOPICAL
Status: DISCONTINUED | OUTPATIENT
Start: 2024-05-01 | End: 2024-05-03 | Stop reason: HOSPADM

## 2024-05-01 RX ORDER — OXYCODONE HYDROCHLORIDE 5 MG/1
5 TABLET ORAL
Status: DISCONTINUED | OUTPATIENT
Start: 2024-05-01 | End: 2024-05-01

## 2024-05-01 RX ORDER — FENTANYL CITRATE-0.9 % NACL/PF 10 MCG/ML
100 PLASTIC BAG, INJECTION (ML) INTRAVENOUS EVERY 5 MIN PRN
Status: DISCONTINUED | OUTPATIENT
Start: 2024-05-01 | End: 2024-05-01 | Stop reason: HOSPADM

## 2024-05-01 RX ORDER — NICOTINE POLACRILEX 4 MG
15-30 LOZENGE BUCCAL
Status: DISCONTINUED | OUTPATIENT
Start: 2024-05-01 | End: 2024-05-03 | Stop reason: HOSPADM

## 2024-05-01 RX ORDER — NICOTINE POLACRILEX 4 MG
15-30 LOZENGE BUCCAL
Status: DISCONTINUED | OUTPATIENT
Start: 2024-05-01 | End: 2024-05-01

## 2024-05-01 RX ORDER — NALBUPHINE HYDROCHLORIDE 20 MG/ML
2.5-5 INJECTION, SOLUTION INTRAMUSCULAR; INTRAVENOUS; SUBCUTANEOUS EVERY 6 HOURS PRN
Status: DISCONTINUED | OUTPATIENT
Start: 2024-05-01 | End: 2024-05-03 | Stop reason: HOSPADM

## 2024-05-01 RX ORDER — ONDANSETRON 2 MG/ML
4 INJECTION INTRAMUSCULAR; INTRAVENOUS EVERY 6 HOURS PRN
Status: DISCONTINUED | OUTPATIENT
Start: 2024-05-01 | End: 2024-05-01 | Stop reason: HOSPADM

## 2024-05-01 RX ORDER — OXYTOCIN/0.9 % SODIUM CHLORIDE 30/500 ML
340 PLASTIC BAG, INJECTION (ML) INTRAVENOUS CONTINUOUS PRN
Status: DISCONTINUED | OUTPATIENT
Start: 2024-05-01 | End: 2024-05-01 | Stop reason: HOSPADM

## 2024-05-01 RX ORDER — CARBOPROST TROMETHAMINE 250 UG/ML
250 INJECTION, SOLUTION INTRAMUSCULAR
Status: DISCONTINUED | OUTPATIENT
Start: 2024-05-01 | End: 2024-05-01 | Stop reason: HOSPADM

## 2024-05-01 RX ORDER — OXYTOCIN 10 [USP'U]/ML
10 INJECTION, SOLUTION INTRAMUSCULAR; INTRAVENOUS
Status: DISCONTINUED | OUTPATIENT
Start: 2024-05-01 | End: 2024-05-01

## 2024-05-01 RX ORDER — LOPERAMIDE HCL 2 MG
2 CAPSULE ORAL
Status: DISCONTINUED | OUTPATIENT
Start: 2024-05-01 | End: 2024-05-01 | Stop reason: HOSPADM

## 2024-05-01 RX ORDER — IBUPROFEN 400 MG/1
800 TABLET, FILM COATED ORAL
Status: DISCONTINUED | OUTPATIENT
Start: 2024-05-01 | End: 2024-05-01

## 2024-05-01 RX ORDER — OXYTOCIN 10 [USP'U]/ML
10 INJECTION, SOLUTION INTRAMUSCULAR; INTRAVENOUS
Status: DISCONTINUED | OUTPATIENT
Start: 2024-05-01 | End: 2024-05-01 | Stop reason: HOSPADM

## 2024-05-01 RX ORDER — PENICILLIN G POTASSIUM 5000000 [IU]/1
5 INJECTION, POWDER, FOR SOLUTION INTRAMUSCULAR; INTRAVENOUS ONCE
Status: COMPLETED | OUTPATIENT
Start: 2024-05-01 | End: 2024-05-01

## 2024-05-01 RX ORDER — TRANEXAMIC ACID 10 MG/ML
1 INJECTION, SOLUTION INTRAVENOUS EVERY 30 MIN PRN
Status: DISCONTINUED | OUTPATIENT
Start: 2024-05-01 | End: 2024-05-01 | Stop reason: HOSPADM

## 2024-05-01 RX ORDER — TERBUTALINE SULFATE 1 MG/ML
0.25 INJECTION, SOLUTION SUBCUTANEOUS
Status: DISCONTINUED | OUTPATIENT
Start: 2024-05-01 | End: 2024-05-01 | Stop reason: HOSPADM

## 2024-05-01 RX ORDER — LOPERAMIDE HCL 2 MG
4 CAPSULE ORAL
Status: DISCONTINUED | OUTPATIENT
Start: 2024-05-01 | End: 2024-05-01 | Stop reason: HOSPADM

## 2024-05-01 RX ORDER — SODIUM CHLORIDE 9 MG/ML
INJECTION, SOLUTION INTRAVENOUS CONTINUOUS PRN
Status: DISCONTINUED | OUTPATIENT
Start: 2024-05-01 | End: 2024-05-03 | Stop reason: HOSPADM

## 2024-05-01 RX ADMIN — SODIUM CHLORIDE, POTASSIUM CHLORIDE, SODIUM LACTATE AND CALCIUM CHLORIDE 1000 ML: 600; 310; 30; 20 INJECTION, SOLUTION INTRAVENOUS at 13:47

## 2024-05-01 RX ADMIN — Medication 2 MILLI-UNITS/MIN: at 12:04

## 2024-05-01 RX ADMIN — SODIUM CHLORIDE, POTASSIUM CHLORIDE, SODIUM LACTATE AND CALCIUM CHLORIDE: 600; 310; 30; 20 INJECTION, SOLUTION INTRAVENOUS at 11:54

## 2024-05-01 RX ADMIN — Medication: at 14:27

## 2024-05-01 RX ADMIN — SODIUM CHLORIDE, POTASSIUM CHLORIDE, SODIUM LACTATE AND CALCIUM CHLORIDE: 600; 310; 30; 20 INJECTION, SOLUTION INTRAVENOUS at 14:06

## 2024-05-01 RX ADMIN — PENICILLIN G POTASSIUM 5 MILLION UNITS: 5000000 POWDER, FOR SOLUTION INTRAMUSCULAR; INTRAPLEURAL; INTRATHECAL; INTRAVENOUS at 11:54

## 2024-05-01 RX ADMIN — ACETAMINOPHEN 650 MG: 325 TABLET, FILM COATED ORAL at 20:34

## 2024-05-01 RX ADMIN — IBUPROFEN 800 MG: 400 TABLET ORAL at 20:34

## 2024-05-01 RX ADMIN — ROPIVACAINE HYDROCHLORIDE 10 ML: 2 INJECTION, SOLUTION EPIDURAL; INFILTRATION at 15:31

## 2024-05-01 RX ADMIN — PENICILLIN G 3 MILLION UNITS: 3000000 INJECTION, SOLUTION INTRAVENOUS at 15:59

## 2024-05-01 ASSESSMENT — ACTIVITIES OF DAILY LIVING (ADL)
ADLS_ACUITY_SCORE: 18
ADLS_ACUITY_SCORE: 19
ADLS_ACUITY_SCORE: 19
ADLS_ACUITY_SCORE: 18
ADLS_ACUITY_SCORE: 19

## 2024-05-01 NOTE — PROGRESS NOTES
[facility]  Labor Progress Note    S: Patient is comfortable with epidual.     O:   Patient Vitals for the past 4 hrs:   BP Temp Temp src Resp SpO2   24 1500 -- -- -- -- 98 %   24 1415 122/75 -- -- 18 98 %   24 1345 120/64 97.2  F (36.2  C) Temporal 16 --     SVE: 6/80/-2  Membranes: PROM since 24. S/p AROM of forebag.    FHT: Baseline 140, moderate variability, + accelerations, no decelerations  [place]: Q3-4 minutes    Pitocin: 4mu/min    A/P: 34 year old  at 37w0d admitted with  PROM, prolonged (2 nights ago- small leaking, no large gush, presented to clinic today for evaluation). Progressing in labor.    Labor: Continue pitocin per protocol. S/p AROM of forebag.  FWB: Category I FHT  GBS: positive, PCN started at 1154  Rh: Positive  Suspected fetal macrosomia:   - Will be prepared at time of delivery- stool in room and extra RN.   - EFW 95% at 36wks with AC 99% (7.7lb). Has been counseled extensively on labor, risk of shoulder dystocia, etc. Offered  section based on extrapolated EFW at 40wks just under 4500g, declines. Of note, had similar EFW for first infant, labored spontaneously at 38+wks and infant measured one pound less than expected EFW.   Anticipate     Risa Liang MD  [facility]  2024, 5:10 PM

## 2024-05-01 NOTE — PROGRESS NOTES
[facility]  Labor Progress Note    S: Patient is comfortable with epidual. Patient consents to AROM of forebag.    O:   Patient Vitals for the past 4 hrs:   BP Pulse Resp   24 1245 -- -- (P) 18   24 1215 119/69 86 16     SVE: /-2  Membranes: PROM since 24. Now s/p AROM of forebag.    FHT: Baseline 145, moderate variability, + accelerations, no decelerations  [place]: Q1.5-4 minutes    Pitocin: 4mu/min    A/P: 34 year old  at 37w0d admitted with  PROM, prolonged (2 nights ago- small leaking, no large gush, presented to clinic today for evaluation). Progressing in labor.    Labor: Continue pitocin per protocol. S/p AROM of forebag.  FWB: Category I FHT  GBS: positive, PCN started at 1154  Rh: Positive  Suspected fetal macrosomia:   - Will be prepared at time of delivery- stool in room and extra RN.   - EFW 95% at 36wks with AC 99% (7.7lb). Has been counseled extensively on labor, risk of shoulder dystocia, etc. Offered  section based on extrapolated EFW at 40wks just under 4500g, declines. Of note, had similar EFW for first infant, labored spontaneously at 38+wks and infant measured one pound less than expected EFW.   Anticipate     MD Chris [facility]  2024, 3:49 PM

## 2024-05-01 NOTE — ANESTHESIA PREPROCEDURE EVALUATION
"Anesthesia Pre-Procedure Evaluation    Patient: Trupti Harvey   MRN: [id number] : 1990        Procedure :           Past Medical History:   Diagnosis Date    Diabetes (H)     GDIC      History reviewed. No pertinent surgical history.   No Known Allergies   Social History     Tobacco Use    Smoking status: Never    Smokeless tobacco: Never   Substance Use Topics    Alcohol use: Not Currently      Wt Readings from Last 1 Encounters:   24 79.3 kg (174 lb 12.8 oz)        Anesthesia Evaluation            ROS/MED HX  ENT/Pulmonary:    (-) asthma   Neurologic:  - neg neurologic ROS     Cardiovascular:    (-) PIH   METS/Exercise Tolerance:     Hematologic:       Musculoskeletal:       GI/Hepatic:     (+) GERD,                   Renal/Genitourinary:       Endo:     (+)  type II DM (GDM),   Using insulin,          Obesity,       Psychiatric/Substance Use:       Infectious Disease:       Malignancy:       Other:            Physical Exam    Airway        Mallampati: II   TM distance: > 3 FB   Neck ROM: full     Respiratory Devices and Support         Dental  no notable dental history         Cardiovascular   cardiovascular exam normal          Pulmonary   pulmonary exam normal                OUTSIDE LABS:  CBC:   Lab Results   Component Value Date    WBC 10.7 2024    WBC 8.9 10/30/2023    HGB 11.9 2024    HGB 12.4 2024    HCT 36.2 2024    HCT 36.2 10/30/2023     2024     2024     BMP:   Lab Results   Component Value Date    GLC 60 (L) 2024    GLC 77 2022     COAGS: No results found for: \"PTT\", \"INR\", \"FIBR\"  POC: No results found for: \"BGM\", \"HCG\", \"HCGS\"  HEPATIC: No results found for: \"ALBUMIN\", \"PROTTOTAL\", \"ALT\", \"AST\", \"GGT\", \"ALKPHOS\", \"BILITOTAL\", \"BILIDIRECT\", \"[place]\"  OTHER:   Lab Results   Component Value Date    A1C 5.7 (H) 2023    TSH 1.76 2023       Anesthesia Plan    ASA Status:  3       Anesthesia Type: Epidural.          "     Consents    Anesthesia Plan(s) and associated risks, benefits, and realistic alternatives discussed. Questions answered and patient/representative(s) expressed understanding.     - Discussed:     - Discussed with:  Patient            Postoperative Care            Comments:    Other Comments: Orders to manage the epidural infusion have been entered, and through coordination with the nurse, we will continute to manage and monitor the patient's labor epidural.  We will continuously be available to adjust as needed thruout the entire L&D process.            Tao Conde MD    I have reviewed the pertinent notes and labs in the chart from the past 30 days and (re)examined the patient.  Any updates or changes from those notes are reflected in this note.

## 2024-05-01 NOTE — H&P
"OB Brief Admit H&P    No significant change in general health status based on examination of the patient, review of Nursing Admission Database and prenatal record.    Pt is a 34 year old  @ 37w0d who presented to L&D with leaking of amniotic fluid. She reports watery discharge that is a change since Monday night (two nights ago). No gush or large amount of fluid.     Patient's prenatal course has been complicated by:  - GDMA2: Passed early and routine 3hr, however started checking BG levels and had consistently elevated fastings. Started insulin at 32wks. On Monday decreased from 12u to 8u long acting.   - Suspected fetal macrosomia: EFW 95% at 36wks with AC 99% (7.7lb). Has been counseled extensively on labor, risk of shoulder dystocia, etc. Offered  section based on extrapolated EFW at 40wks just under 4500g, declines. Of note, had similar EFW for first infant, labored spontaneously at 38+wks and infant measured one pound less than expected EFW.   - Prediabetes: hgb A1C 5.7. Conceived on metformin. Continued metformin through first trimester.  - First infant with small VSD (did not require repair): level II and echo wnl.     Prenatal Labs:    Blood type B+  Rubella immune  GCT passed early and routine 3hr GTT  GBS positive     EFW: 8lb    Ht 1.626 m (5' 4\")   Wt 79.3 kg (174 lb 12.8 oz)   LMP 2023   BMI 30.00 kg/m    EFM:  130 baseline, moderate variability, +accels, no decels  [place]: quiet minutes  SVE: 2-3/70/-2  Membranes:  ROM, likely 2 nights ago     Labs:   ROM+ positive in clinic    Assessment:  34 year old  @ 37w0d with PROM, prolonged (2 nights ago- small leaking, no large gush, presented to clinic today for evaluation).     Plan:  1. Admit to labor and delivery   2. Labor: recommend IOL with pitocin, patient is in agreement. Titrate per protocol.  3. GBS positive: start penicillin.  4. GDMA2: BG checks per protocol. Insulin drip PRN.  5. Suspected fetal macrosomia:   - Will " be prepared at time of delivery- stool in room and extra RN.   - EFW 95% at 36wks with AC 99% (7.7lb). Has been counseled extensively on labor, risk of shoulder dystocia, etc. Offered  section based on extrapolated EFW at 40wks just under 4500g, declines. Of note, had similar EFW for first infant, labored spontaneously at 38+wks and infant measured one pound less than expected EFW.     Anticipate     Risa Liang MD  2024  11:17 AM

## 2024-05-01 NOTE — PLAN OF CARE
Pt arrives from clinic for confirmed SROM. Monitors applied with verbal consent. Prenatal reviewed. Admission intake done. Plan per Dr Noel is to start pitocin for induction, start pcn for gbs positive status, monitor BG for GDIC status. Pt agrees with POC. Report given to SANTA Reese to assume cares.

## 2024-05-01 NOTE — ANESTHESIA PROCEDURE NOTES
Epidural catheter Procedure Note    Pre-Procedure   Staff -        Anesthesiologist:  Tao Conde MD       Performed By: Anesthesiologist       Location: OB       Pre-Anesthestic Checklist: patient identified, risks and benefits discussed, informed consent, monitors and equipment checked, pre-op evaluation and at physician/surgeon's request  Timeout:       Correct Patient: Yes        Correct Procedure: Yes        Correct Site: Yes        Correct Position: Yes   Procedure Documentation  Procedure: epidural catheter       Patient Position: sitting       Skin prep: Chloraprep       Local skin infiltrated with mL of 1% lidocaine.        Insertion Site: L3-4. (midline approach).       Technique: LORT saline        GABO at 4.5 cm.       Needle Type: Touhy needle       Needle Gauge: 17.        Needle Length (Inches): 5        Catheter: 18 G.          Catheter threaded easily.           Threaded 8.5 cm at skin.         # of attempts: 1 and  # of redirects:  0    Assessment/Narrative         Paresthesias: No.       Test dose of 3 mL at.         Test dose negative, 3 minutes after injection, for signs of intravascular, subdural, or intrathecal injection.       Insertion/Infusion Method: LORT saline       Aspiration negative for Heme or CSF via Epidural Catheter.    Medication(s) Administered   0.2% Ropivacaine (Epidural) - EPIDURAL   10 mL - 5/1/2024 3:31:00 PM   Comments:  Risks, benefits, alternatives of epidural analgesia discussed with the patient who agrees to proceed.  After a procedural timeout confirming the correct patient and details of the procedure, 1% lidocaine was injected superficially over the skin for topical anesthesia.  A 17 G Tuohy needle was advanced at the above lumbar interspace until contact was felt with ligamentum flavum.  Loss of resistance to saline was encountered at above noted depth.  3 ml of saline was injected to expand the epidural space.  The epidural catheter was then easily threaded  "to the depth noted above.  Paresthesias were as noted above.Ropivacaine 10 ml bolus via epidural catheter administered at end of procedure.  Patient tolerated well.       FOR [facility] ONLY:   Pain Team Contact information: please page the Pain Team Via [employer]. Search \"Pain\". During daytime hours, please page the attending first. At night please page the resident first.      "

## 2024-05-01 NOTE — PLAN OF CARE
Took over pt care at 1110. Plan of care gone over with pt. Call light within reach. At 1154 PCN started for GBS+. At 1204 Pitocin started with pt verbal consent per MD orders. At 1303 Dr Noel updated on maternal status and fht's. At 1345 SVE done and pt requested an epidural. At 1415 Dr. Mejia in room for epidural procedure. At 1500 Pt comfortable and SVE and blake inserted. At 1520 Dr. Noel updtaed and informed about forebag felt. At 1535 Dr. Noel at bedside and AROM and SVE done. At 1610 pt sttaed feeling pressure and SVE done. At 1700 Dr. Noel at bedside and SVE done. Dr. Moran now to take over. At 1800 SVE done pt now 7cm. At 1845 SVE done to pt for feeling pressure and pt complete. Dr. Moran called and will be coming  for delivery now. At 1910 Report given to Bernadette GONZALEZ and Dr. Moran here for delivery.

## 2024-05-02 LAB
GLUCOSE BLDC GLUCOMTR-MCNC: 104 MG/DL (ref 70–99)
GLUCOSE BLDC GLUCOMTR-MCNC: 119 MG/DL (ref 70–99)
GLUCOSE BLDC GLUCOMTR-MCNC: 178 MG/DL (ref 70–99)
GLUCOSE BLDC GLUCOMTR-MCNC: 184 MG/DL (ref 70–99)
HBA1C MFR BLD: 6 %
HGB BLD-MCNC: 11.8 G/DL (ref 11.7–15.7)

## 2024-05-02 PROCEDURE — 36415 COLL VENOUS BLD VENIPUNCTURE: CPT | Performed by: OBSTETRICS & GYNECOLOGY

## 2024-05-02 PROCEDURE — 99222 1ST HOSP IP/OBS MODERATE 55: CPT | Performed by: PHYSICIAN ASSISTANT

## 2024-05-02 PROCEDURE — 250N000013 HC RX MED GY IP 250 OP 250 PS 637: Performed by: OBSTETRICS & GYNECOLOGY

## 2024-05-02 PROCEDURE — 83036 HEMOGLOBIN GLYCOSYLATED A1C: CPT | Performed by: OBSTETRICS & GYNECOLOGY

## 2024-05-02 PROCEDURE — 120N000012 HC R&B POSTPARTUM

## 2024-05-02 PROCEDURE — 85018 HEMOGLOBIN: CPT | Performed by: OBSTETRICS & GYNECOLOGY

## 2024-05-02 RX ORDER — NICOTINE POLACRILEX 4 MG
15-30 LOZENGE BUCCAL
Status: DISCONTINUED | OUTPATIENT
Start: 2024-05-02 | End: 2024-05-02

## 2024-05-02 RX ORDER — NALOXONE HYDROCHLORIDE 0.4 MG/ML
0.2 INJECTION, SOLUTION INTRAMUSCULAR; INTRAVENOUS; SUBCUTANEOUS
Status: DISCONTINUED | OUTPATIENT
Start: 2024-05-02 | End: 2024-05-03 | Stop reason: HOSPADM

## 2024-05-02 RX ORDER — LANCETS
EACH MISCELLANEOUS
Qty: 100 EACH | Refills: 3 | Status: SHIPPED | OUTPATIENT
Start: 2024-05-02

## 2024-05-02 RX ORDER — NALOXONE HYDROCHLORIDE 0.4 MG/ML
0.4 INJECTION, SOLUTION INTRAMUSCULAR; INTRAVENOUS; SUBCUTANEOUS
Status: DISCONTINUED | OUTPATIENT
Start: 2024-05-02 | End: 2024-05-03 | Stop reason: HOSPADM

## 2024-05-02 RX ORDER — DEXTROSE MONOHYDRATE 25 G/50ML
25-50 INJECTION, SOLUTION INTRAVENOUS
Status: DISCONTINUED | OUTPATIENT
Start: 2024-05-02 | End: 2024-05-02

## 2024-05-02 RX ADMIN — ACETAMINOPHEN 650 MG: 325 TABLET, FILM COATED ORAL at 15:08

## 2024-05-02 RX ADMIN — IBUPROFEN 800 MG: 400 TABLET ORAL at 03:03

## 2024-05-02 RX ADMIN — IBUPROFEN 800 MG: 400 TABLET ORAL at 15:08

## 2024-05-02 RX ADMIN — DOCUSATE SODIUM 100 MG: 100 CAPSULE, LIQUID FILLED ORAL at 08:26

## 2024-05-02 RX ADMIN — IBUPROFEN 800 MG: 400 TABLET ORAL at 08:59

## 2024-05-02 RX ADMIN — ACETAMINOPHEN 650 MG: 325 TABLET, FILM COATED ORAL at 03:03

## 2024-05-02 RX ADMIN — ACETAMINOPHEN 650 MG: 325 TABLET, FILM COATED ORAL at 21:06

## 2024-05-02 RX ADMIN — ACETAMINOPHEN 650 MG: 325 TABLET, FILM COATED ORAL at 08:58

## 2024-05-02 RX ADMIN — IBUPROFEN 800 MG: 400 TABLET ORAL at 21:06

## 2024-05-02 ASSESSMENT — ACTIVITIES OF DAILY LIVING (ADL)
ADLS_ACUITY_SCORE: 18
ADLS_ACUITY_SCORE: 19
ADLS_ACUITY_SCORE: 18
ADLS_ACUITY_SCORE: 19
ADLS_ACUITY_SCORE: 18

## 2024-05-02 NOTE — PROVIDER NOTIFICATION
05/02/24 0625   Provider Notification   Provider Name/Title Dr. Moran   Method of Notification Phone   Request Evaluate-Remote   Notification Reason Lab Results       RN notified Dr. Moran pt's AM fasting BG- 184. However, pt ate five peanut butter pretzels and 2 small pieces of licorice candy about 2 hours prior to BG check. Per MD, order for another AM fasting BG check.

## 2024-05-02 NOTE — L&D DELIVERY NOTE
OB Vaginal Delivery Note     Pt is a 34 year old  @ 37w0d who presented to L&D with leaking of amniotic fluid. She reports watery discharge that is a change since Monday night (two nights ago). No gush or large amount of fluid.      Patient's prenatal course has been complicated by:  - GDMA2: Passed early and routine 3hr, however started checking BG levels and had consistently elevated fastings. Started insulin at 32wks. On Monday decreased from 12u to 8u long acting.   - Suspected fetal macrosomia: EFW 95% at 36wks with AC 99% (7.7lb). Has been counseled extensively on labor, risk of shoulder dystocia, etc. Offered  section based on extrapolated EFW at 40wks just under 4500g, declines. Of note, had similar EFW for first infant, labored spontaneously at 38+wks and infant measured one pound less than expected EFW.   - Prediabetes: hgb A1C 5.7. Conceived on metformin. Continued metformin through first trimester.  - First infant with small VSD (did not require repair): level II and echo wnl.      Prenatal Labs:    Blood type B+  Rubella immune  GCT passed early and routine 3hr GTT  GBS positive      EFW: 8lb          Hospital Course:    First Stage: On admission, contractions were every none and patient was 3cm dilated. FHTs were in the 130 with accelerations present. mod variability,  no decelerations noted.   Abdomen was non-tender.  EFW was 8lbs by Leopold's.  Patient's labor induced with pitocin.    At the patient's request, she received epidural  analgesia.  She did receive pitocin for induction of labor  of labor, to a maximum of 6mu/min.  SROM occurred at 48 hours prior to delivery with clear fluid noted.  Patient reached complete cervical dilation at 1845 on 2024.    Second Stage:  Patient did not  labor down , and began pushing at 1915.  Good maternal expulsive efforts were noted.  Fetal heart tones remained reassuring during the second stage.  Baseline 130, with mod variability, variable  decelerations noted.  She was able to bring the fetal vertex to a full crown.  The fetal vertex was then easily delivered, followed by the fetal shoulders without complications.  A  nuchal cord was not present.  A female infant was then delivered without complications at 1925 on 5/1/2024.  The mouth and nares were bulb suctioned.  The cord was clamped after it had stopped pulsating, cut and the infant was placed on moms abdomen.  The infant's weight was pending.  Apgars were 9 and 9 at one and five minutes .       Third Stage:  The placenta then delivered at 1934 .  It was noted to be intact with a three vessel cord.  The patient's perineum was inspected and  1st degree lacerations noted as well as bilateral periurethral lacs, none of which were bleeding, and not needing repair.  EBL for the procedure was 50cc.    Sponge and needle counts were correct.  The patient and infant remained in the delivery suite following delivery in stable condition.    Aaron Moran DO  5/1/2024  7:45 PM

## 2024-05-02 NOTE — PLAN OF CARE
Goal Outcome Evaluation:       VSS Blood sugar at 0925 178. MD ordered Hospitalist consult for management of blood sugars. Pt using Ibuprofen and tylenol for pain control. Up independently in the room. Breast feeding infant on demand, latching well. Will continue to monitor.

## 2024-05-02 NOTE — PROGRESS NOTES
"OB Post-partum Note PPD# 1    S:  Patient doing well.  Pain controlled.  Voiding.  Bleeding normal.  Breast feeding.    BS elevate this morning. Patient endorses eating licorice and caramel latte, but these types of foods did not cause blood sugar spikes during pregnancy.    O:  /70 (BP Location: Left arm)   Pulse 68   Temp 98.9  F (37.2  C) (Oral)   Resp 16   Ht 1.626 m (5' 4\")   Wt 79.3 kg (174 lb 12.8 oz)   LMP 2023   SpO2 100%   Breastfeeding Unknown   BMI 30.00 kg/m    Gen- A&O, NAD  Abd- Non-tender, fundus firm at umbilicus  Ext- non-tender, no edema    Hemoglobin   Date Value Ref Range Status   2024 11.8 11.7 - 15.7 g/dL Final     Recent Labs   Lab 24  0925 24  0614 24  1813 24  1717 24  1616 24  1512   * 184* 99 100* 156* 116*       B POS  Rubella Immune    A/P: 34 year old  PPD# 1 s/p     Pregnancy c/b:  - GDMA2 (bedtime insulin)  - Prediabetes A1c 5.7, conceived on metformin  - Suspected fetal macrosomia      1.  Routine post-partum cares.  2.  GDMA2: poorly controlled BS today. May be related to diet, but extreme even for that. Will place Hospitalist consult. Plan close monitoring as outpatient as well with 2 hour GTT at 6-8 weeks PP.  3.  Anticipate d/c home on PPD# 2.    Melly Webster MD  2024  9:57 AM  "

## 2024-05-02 NOTE — PLAN OF CARE
Vital signs stable. Postpartum assessment WDL. Pain controlled with Tylenol and Ibuprofen. Patient voiding without difficulty. Ambulating independently. Working on breastfeeding q3h with assist. Patient and infant bonding well. Repeat AM fasting BG. Will continue with current plan of care.

## 2024-05-02 NOTE — PLAN OF CARE
Vital signs stable. Postpartum assessment WDL. Pain controlled with Tylenol and Ibuprofen. Patient voiding without difficulty. Working on breastfeeding q3h and on demand with assist. Patient and infant bonding well. Will continue with current plan of care.

## 2024-05-02 NOTE — PLAN OF CARE
Data: Trupti Harvey transferred to 410 via wheelchair at 2200. Baby transferred via parent's arms.  Action: Receiving unit notified of transfer: Yes. Patient and family notified of room change. Report given to Ezequiel DILLON RN at 2200. Belongings sent to receiving unit. Accompanied by Registered Nurse. Oriented patient to surroundings. Call light within reach. ID bands double-checked with receiving RN.  Response: Patient tolerated transfer and is stable.

## 2024-05-02 NOTE — LACTATION NOTE
"Lactation visit with Trupti, SHEEBA Dukese, and baby girl.    Infant has been breastfeeding well on the R breast per Primary RN , but they have been having a harder time to get infant latched on the L. Trupti has infant positioned in football hold at L breast when  visits. Infant is 37 weeks but also LGA so we talked about positioning infant \"nose to nipple\" to ensure infant is positioned correctly before trying to latch her.  We practiced hand expression, Trupti can easily express drops of milk. Infant already nursed on the R breast before this L side, so we were able to get infant latched and she suckled for a couple short bursts but then fell asleep. Infant passed her first initial blood sugars.     Reviewed  breastfeeding basics:   1. Watch for early feeding cues (licking lips, stirring or rooting, sucking movement with mouth, hands to mouth).  2. Infant should breastfeed on demand and a minimum of 8 times in 24 hours. Encourage/offer to breastfeed infant at least 3 hours (from the start of the last feeding). Encouraged to utilize RN support with breastfeeding.      Educated on techniques to wake a sleepy baby for feedings: un-swaddle infant, check infant's diaper, begin snuggling skin to skin and begin gentle stimulation including stroking infant's back and feet. Reviewed breast feeding section in our \"Guide to Postpartum and [place] Care.\" Highlighting pages that educates to  feeding patterns/behavior: Day 1 infant may be more sleepy (the birthday nap); followed by cluster-feeding (breastfeeding marathon) on second day/night. We reviewed the feeding log in back of booklet, how/why tracking infant's feedings and wet/dirty diapers is important.     Recommended infant is offered both breasts with every feeding session. Reviewed breastfeeding positions and techniques to obtain/maintain deep latch, including nose to nipple alignment and how to support infant's shoulder blades and neck to allow flexion for " "optimal latch positioning. Discussed breastfeeding with a correct latch should feel like a strong \"tug or pull\".   Discussed physiology of milk production from colostrum through milk \"coming in\" between day 3-5 (typically); emphasizing adequate stimulation to the breast is what causes this change to occur. Answered questions regarding \"how to know when infant is done at the breast\". Discussed listening for infant to have audible swallows along with watching for changes in infant's stool color. Discussed normal infant weight loss and when infant should be back to birth weight.    Educated on products to help with passive milk collection (ie: Haakaa). Trupti has a new breast pump for home use.     Appreciative of visit.    Aditi Eddy RN, IBCLC          "

## 2024-05-02 NOTE — CONSULTS
[facility]  Consult Note - Hospitalist Service  Date of Admission:  2024  Consult Requested by: OBGYN  Reason for Consult: Postpartum, Hyperglycemia, GDM    Assessment & Plan   Trupti Harvey is a 34 year old female  with history of GDM who delivered on 24 via vaginal delivery.   Hospitalist consulted for postpartum hyperglycemia.    Postpartum hyperglycemia  Gestational diabetes, s/p vaginal delivery 24  Last HbA1c 5.7% 2023 --> 6% 24   PTA Regimen: NPH 12 units but was having glucose levels in low 70s, therefore decreased to 8 units Q HS on .  Vaginal delivery 24, concerned about hyperglycemia postpartum. Of note, several gluc checks are not fasting levels.  - Stop NPH now and on discharge  - Sliding scale insulin Low dose during hospitalization, likely no need for sliding scale on discharge  - Ensure glucose checks are 2 hours fasting, if not, ok to skip check unless symptomatic  - On discharge, check glucose at home 1-2x/day for 1-2 weeks, ok to discontinue if glucose is within range.   - If glucose is persistently >200, please check in with Endocrinology  - With history of GDM do not anticipate she will have any insulin needs post delivery and can follow up with PCP/OBGYN post discharge for follow up testing (fasting glucose or HbA1c in 4-8 weeks) as needed.   - Needs refill of test strips, and Accu-Chek, orders placed on discharge  - Educated on increased risk of type 2 diabetes in the setting of history of gestational diabetes  - Discharge planner completed to discontinue insulin and for follow up recommendations.    Per Endocrinology recommendations:  Postpartum: Continue checking glucose 1 day per week after your baby is born. Optimal times to check are when you first wake up in the morning (Goal is <100), before your biggest meal of the day (Goal is <100), and about two hours after your biggest meal of the day (Goal is <140). Bring these readings  "with you to your 6 week post partum visit. If you have multiple readings >200, please contact your provider sooner. (This information is also listed on page 58 of your log book.)        The patient's care was discussed with the Attending Physician, Dr. Sharpe, Bedside Nurse, Patient, and Patient's Family.    Clinically Significant Risk Factors                         # Obesity: Estimated body mass index is 30 kg/m  as calculated from the following:    Height as of this encounter: 1.626 m (5' 4\").    Weight as of this encounter: 79.3 kg (174 lb 12.8 oz)., PRESENT ON ADMISSION            Mirta Dailey PA-C  Hospitalist Service  Securely message with Mobile Ads (more info)  Text page via [facility] Paging/Directory   ______________________________________________________________________    Chief Complaint   Hyperglycemia postpartum    History is obtained from the patient and electronic health record    History of Present Illness   Trupti Harvey is a 34 year old female  with history of GDM who delivered on 24 via vaginal delivery.   Hospitalist consulted for postpartum hyperglycemia.  Patient has been following with endocrinology prior to delivery.  She is on NPH 12 units however decreased to 8 units a few days ago due to glucose levels 70s to 80s.  Her A1c last summer was 5.7% consistent with prediabetes and rechecked today at 6%.  She had a uncomplicated vaginal delivery of a baby girl yesterday.  She had a glucose level earlier this morning of 184 followed by another level of 178 prompting hospitalist consultation.  Reviewed endocrinology notes.  Glucose during my visit 119 she had had some trail mix 1 hour earlier.  She is asymptomatic.  Several questions were addressed.  She is otherwise feeling well.      Past Medical History    Past Medical History:   Diagnosis Date    Diabetes (H)     GDIC       Past Surgical History   History reviewed. No pertinent surgical history.    Medications   I have reviewed " this patient's current medications  Medications Prior to Admission   Medication Sig Dispense Refill Last Dose    Docosahexaenoic Acid (DHA PO) Take 1 tablet by mouth daily   Past Month    insulin  UNIT/ML vial Inject 8 Units Subcutaneous at bedtime   4/30/2024    Prenatal Vit-Fe Fumarate-FA (PNV PRENATAL PLUS MULTIVITAMIN) 27-1 MG TABS per tablet Take 1 tablet by mouth daily   4/30/2024          Review of Systems    The 10 point Review of Systems is negative other than noted in the HPI or here.     Social History   I have reviewed this patient's social history and updated it with pertinent information if needed.  Social History     Tobacco Use    Smoking status: Never    Smokeless tobacco: Never   Substance Use Topics    Alcohol use: Not Currently    Drug use: Never         Allergies   No Known Allergies     Physical Exam   Vital Signs: Temp: 98.9  F (37.2  C) Temp src: Oral BP: 120/77 Pulse: 76   Resp: 16 SpO2: 100 % O2 Device: None (Room air)    Weight: 174 lbs 12.8 oz    Physical Exam    General: Awake, alert, very pleasant woman who appears stated age. Looks comfortable sitting up in bed. No acute distress.  HEENT: Normocephalic, atraumatic. Extraocular movements intact.   Respiratory: Breathing comfortably on room air  Musculoskeletal: Moves all extremities equally.  Neurologic: AAO x3.   Psychiatric: Appropriate mood and affect. No obvious anxiety or depression.    Medical Decision Making       45 MINUTES SPENT BY ME on the date of service doing chart review, history, exam, documentation & further activities per the note.      Data     I have personally reviewed the following data over the past 24 hrs:    N/A  \   11.8   / N/A     N/A N/A N/A /  119 (H)   N/A N/A N/A \     TSH: N/A T4: N/A A1C: 6.0 (H)       Imaging results reviewed over the past 24 hrs:   No results found for this or any previous visit (from the past 24 hour(s)).

## 2024-05-03 VITALS
WEIGHT: 167.4 LBS | DIASTOLIC BLOOD PRESSURE: 82 MMHG | RESPIRATION RATE: 16 BRPM | HEART RATE: 74 BPM | HEIGHT: 64 IN | TEMPERATURE: 98.2 F | SYSTOLIC BLOOD PRESSURE: 123 MMHG | OXYGEN SATURATION: 100 % | BODY MASS INDEX: 28.58 KG/M2

## 2024-05-03 LAB
GLUCOSE BLDC GLUCOMTR-MCNC: 144 MG/DL (ref 70–99)
GLUCOSE BLDC GLUCOMTR-MCNC: 89 MG/DL (ref 70–99)

## 2024-05-03 PROCEDURE — 250N000013 HC RX MED GY IP 250 OP 250 PS 637: Performed by: OBSTETRICS & GYNECOLOGY

## 2024-05-03 RX ADMIN — IBUPROFEN 800 MG: 400 TABLET ORAL at 03:05

## 2024-05-03 RX ADMIN — DOCUSATE SODIUM 100 MG: 100 CAPSULE, LIQUID FILLED ORAL at 08:22

## 2024-05-03 RX ADMIN — ACETAMINOPHEN 650 MG: 325 TABLET, FILM COATED ORAL at 03:04

## 2024-05-03 ASSESSMENT — ACTIVITIES OF DAILY LIVING (ADL)
ADLS_ACUITY_SCORE: 18

## 2024-05-03 NOTE — PLAN OF CARE
Goal Outcome Evaluation:      Plan of Care Reviewed With: patient, spouse        Patient taking Tylenol and Ibuprofen for pain with adequate pain relief. Patient ambulating independently, voiding without difficulty. Patient breastfeeding infant well. Encouraged patient to call with needs/questions. Call light within reach, will continue with current plan of care until discharge.

## 2024-05-03 NOTE — DISCHARGE INSTRUCTIONS
Warning Signs after Having a Baby    Keep this paper on your fridge or somewhere else where you can see it.    Call your provider if you have any of these symptoms up to 12 weeks after having your baby.    Thoughts of hurting yourself or your baby  Pain in your chest or trouble breathing  Severe headache not helped by pain medicine  Eyesight concerns (blurry vision, seeing spots or flashes of light, other changes to eyesight)  Fainting, shaking or other signs of a seizure    Call 9-1-1 if you feel that it is an emergency.     The symptoms below can happen to anyone after giving birth. They can be very serious. Call your provider if you have any of these warning signs.    My provider s phone number: _______________________    Losing too much blood (hemorrhage)    Call your provider if you soak through a pad in less than an hour or pass blood clots bigger than a golf ball. These may be signs that you are bleeding too much.    Blood clots in the legs or lungs    After you give birth, your body naturally clots its blood to help prevent blood loss. Sometimes this increased clotting can happen in other areas of the body, like the legs or lungs. This can block your blood flow and be very dangerous.     Call your provider if you:  Have a red, swollen spot on the back of your leg that is warm or painful when you touch it.   Are coughing up blood.     Infection    Call your provider if you have any of these symptoms:  Fever of 100.4 F (38 C) or higher.  Pain or redness around your stitches if you had an incision.   Any yellow, white, or green fluid coming from places where you had stitches or surgery.    Mood Problems (postpartum depression)    Many people feel sad or have mood changes after having a baby. But for some people, these mood swings are worse.     Call your provider right away if you feel so anxious or nervous that you can't care for yourself or your baby.    Preeclampsia (high blood pressure)    Even if you  "didn't have high blood pressure when you were pregnant, you are at risk for the high blood pressure disease called preeclampsia. This risk can last up to 12 weeks after giving birth.     Call your provider if you have:   Pain on your right side under your rib cage  Sudden swelling in the hands and face    Remember: You know your body. If something doesn't feel right, get medical help.     For informational purposes only. Not to replace the advice of your health care provider. Copyright 2020 [employer] [facility]. All rights reserved. Clinically reviewed by Oraina Marie, RNC-OB, MSN. [employer] [id number] - Rev .    Postpartum Care at Home With Your Baby: Care Instructions  Overview     After childbirth (postpartum period), your body goes through many changes as you recover. In these weeks after delivery, try to take good care of yourself. Get rest whenever you can and accept help from others.  It may take 4 to 6 weeks to feel like yourself again, and possibly longer if you had a  birth. You may feel sore or very tired as you recover. After delivery, you may continue to have contractions as the uterus returns to the size it was before your pregnancy. You will also have some vaginal bleeding. And you may have pain around the vagina as you heal. Several days after delivery you may also have pain and swelling in your breasts as they fill with milk. There are things you can do at home to help ease these discomforts.  After childbirth, it's common to feel emotional. You may feel irritable, cry easily, and feel happy one minute and sad the next. This is called the \"baby blues.\" Hormone changes are one cause of these emotional changes. These feelings usually get better within a couple of weeks. If they don't, talk to your doctor or midwife.  In the first couple of weeks after you give birth, your doctor or midwife may want to check in with you and make a plan for follow-up care. You will likely have a " complete postpartum visit in the first 3 months after delivery. At that time, your doctor or midwife will check on your recovery and see how you're doing. But if you have questions or concerns before then, you can always call your doctor or midwife.  Follow-up care is a key part of your treatment and safety. Be sure to make and go to all appointments, and call your doctor if you are having problems. It's also a good idea to know your test results and keep a list of the medicines you take.  How can you care for yourself at home?  Taking care of your body  Use pads instead of tampons for bleeding. After birth, you will have bloody vaginal discharge. You may also pass some blood clots that shouldn't be bigger than an egg. Over the next 6 weeks or so, your bleeding should decrease a little every day and slowly change to a pinkish and then whitish discharge.  For cramps or mild pain, try an over-the-counter pain medicine, such as acetaminophen (Tylenol) or ibuprofen (Advil, Motrin). Read and follow all instructions on the label.  To ease pain around the vagina or from hemorrhoids:  Put ice or a cold pack on the area for 10 to 20 minutes at a time. Put a thin cloth between the ice and your skin.  Try sitting in a few inches of warm water (sitz bath) when you can or after bowel movements.  Clean yourself with a gentle squeeze of warm water from a bottle instead of wiping with toilet paper.  Use witch hazel or hemorrhoid pads (such as Tucks).  Try using a cold compress for sore and swollen breasts. And wear a supportive bra that fits.  Ease constipation by drinking plenty of fluids and eating high-fiber foods. Ask your doctor or midwife about over-the-counter stool softeners.  Activity  Rest when you can.  Ask for help from family or friends when you need it.  If you can, have another adult in your home for at least 2 or 3 days after birth.  When you feel ready, try to get some exercise every day. For many people, walking  is a good choice. Don't do any heavy exercise until your doctor or midwife says it's okay.  Ask your doctor or midwife when it is okay to have vaginal sex.  If you don't want to get pregnant, talk to your doctor or midwife about birth control options. You can get pregnant even before your period returns. Also, you can get pregnant while you are breastfeeding.  Talk to your doctor or midwife if you want to get pregnant again. They can talk to you about when it is safe.  Emotional health  It's normal to have some sadness, anxiety, and mood swings after delivery. It may help to talk with a trusted friend or family member. You can also call the Maternal Mental Health Hotline at 5-916[facility] (1-253.230.9105) for support. If these mood changes last more than a couple of weeks, talk to your doctor or midwife.  When should you call for help?  Share this information with your partner, family, or a friend. They can help you watch for warning signs.  Call 911  anytime you think you may need emergency care. For example, call if:    You feel you cannot stop from hurting yourself, your baby, or someone else.     You passed out (lost consciousness).     You have chest pain, are short of breath, or cough up blood.     You have a seizure.   Where to get help 24 hours a day, 7 days a week   If you or someone you know talks about suicide, self-harm, a mental health crisis, a substance use crisis, or any other kind of emotional distress, get help right away. You can:    Call the Suicide and Crisis Lifeline at 348.     Call 1-662-684-TALK (1-884.815.6119).     Text HOME to 187468 to access the Crisis Text Line.   Consider saving these numbers in your phone.  Go to [email or web address] for more information or to chat online.  Call your doctor or midwife now or seek immediate medical care if:    You have signs of hemorrhage (too much bleeding), such as:  Heavy vaginal bleeding. This means that you are soaking through one or more pads in an  "hour. Or you pass blood clots bigger than an egg.  Feeling dizzy or lightheaded, or you feel like you may faint.  Feeling so tired or weak that you cannot do your usual activities.  A fast or irregular heartbeat.  New or worse belly pain.     You have signs of infection, such as:  A fever.  Increased pain, swelling, warmth, or redness from an incision or wound.  Frequent or painful urination or blood in your urine.  Vaginal discharge that smells bad.  New or worse belly pain.     You have symptoms of a blood clot in your leg (called a deep vein thrombosis), such as:  Pain in the calf, back of the knee, thigh, or groin.  Swelling in the leg or groin.  A color change on the leg or groin. The skin may be reddish or purplish, depending on your usual skin color.     You have signs of preeclampsia, such as:  Sudden swelling of your face, hands, or feet.  New vision problems (such as dimness, blurring, or seeing spots).  A severe headache.     You have signs of heart failure, such as:  New or increased shortness of breath.  New or worse swelling in your legs, ankles, or feet.  Sudden weight gain, such as more than 2 to 3 pounds in a day or 5 pounds in a week.  Feeling so tired or weak that you cannot do your usual activities.     You had spinal or epidural pain relief and have:  New or worse back pain.  Increased pain, swelling, warmth, or redness at the injection site.  Tingling, weakness, or numbness in your legs or groin.   Watch closely for changes in your health, and be sure to contact your doctor or midwife if:    Your vaginal bleeding isn't decreasing.     You feel sad, anxious, or hopeless for more than a few days.     You are having problems with your breasts or breastfeeding.   Where can you learn more?  Go to https://www.healthwise.net/patiented  Enter Z768 in the search box to learn more about \"Postpartum Care at Home With Your Baby: Care Instructions.\"  Current as of: July 10, 2023               Content " Version: 14.0    5212-0204 [employer].   Care instructions adapted under license by your healthcare professional. If you have questions about a medical condition or this instruction, always ask your healthcare professional. [employer] disclaims any warranty or liability for your use of this information.

## 2024-05-03 NOTE — PLAN OF CARE
Goal Outcome Evaluation:      Plan of Care Reviewed With: patient, spouse    Overall Patient Progress: improvingOverall Patient Progress: improving    VSS. Pain well controlled with tylenol and ibuprofen. Up independently in room. Voiding without difficulty. Working on breastfeeding  and  cares. No sliding scale insulin needed for dinner or HS BG. Continue with plan of care and notify MD as needed.

## 2024-05-03 NOTE — PLAN OF CARE
Goal Outcome Evaluation:      Plan of Care Reviewed With: patient, spouse           Patient discharged home with spouse per order. Discharge instructions reviewed with patient, patient states understanding. Discharge prescriptions given and reviewed with patient, paper signed. No further questions or concerns.

## 2024-05-03 NOTE — PLAN OF CARE
Goal Outcome Evaluation:      Plan of Care Reviewed With: patient, spouse           Patient taking Tylenol and Ibuprofen for pain with adequate pain relief. Patient ambulating independently, voiding without difficulty. Patient breastfeeding infant well. Encouraged patient to call with needs/questions. Call light within reach, will continue with current plan of care.

## 2024-05-03 NOTE — PROGRESS NOTES
[facility]    Medicine Progress Note - Hospitalist Service    Date of Admission:  2024    Assessment & Plan   Trupti Harvey is a 34 year old female  with history of GDM who delivered on 24 via vaginal delivery.   Hospitalist consulted for postpartum hyperglycemia.    Postpartum hyperglycemia  Gestational diabetes, s/p vaginal delivery 24  Last HbA1c 5.7% 2023 --> 6% 24   PTA Regimen: NPH 12 units but was having glucose levels in low 70s, therefore decreased to 8 units Q HS on .  Vaginal delivery 24, concerned about hyperglycemia postpartum. Of note, several gluc checks are not fasting levels.  * over the last 24 hours glucose has ranged from   - Stop NPH now and on discharge  - Stop sliding scale   - No insulin needed at discharge  - Ensure glucose checks are 2 hours fasting, if not, ok to skip check unless symptomatic  - On discharge, check glucose at home 1-2x/day for 1-2 weeks, ok to discontinue if glucose is within range.   - If glucose is persistently >200, please check in with Endocrinology  - With history of GDM do not anticipate she will have any insulin needs post delivery and can follow up with PCP/OBGYN post discharge for follow up testing (fasting glucose or HbA1c in 6-12 weeks) as needed.   - Needs refill of test strips, and Accu-Chek, orders placed on discharge  - Educated on increased risk of type 2 diabetes in the setting of history of gestational diabetes  - Discussed dietary recommendations to avoid high sugar foods and to ensure optimal protein intake.   - Discharge planner completed to discontinue insulin and for follow up recommendations.    Per Endocrinology recommendations:  Postpartum: Continue checking glucose 1 day per week after your baby is born. Optimal times to check are when you first wake up in the morning (Goal is <100), before your biggest meal of the day (Goal is <100), and about two hours after your biggest meal  "of the day (Goal is <140). Bring these readings with you to your 6 week post partum visit. If you have multiple readings >200, please contact your provider sooner. (This information is also listed on page 58 of your log book.)     Hospitalist service will sign off at this time.         Diet: Room Service  Regular Diet Adult    DVT Prophylaxis: Per OB/Gyn  Winters Catheter: Not present  Lines: None     Cardiac Monitoring: None  Code Status: Full Code      Clinically Significant Risk Factors                         # Obesity: Estimated body mass index is 30 kg/m  as calculated from the following:    Height as of this encounter: 1.626 m (5' 4\").    Weight as of this encounter: 79.3 kg (174 lb 12.8 oz)., PRESENT ON ADMISSION            Disposition Plan     Medically Ready for Discharge: Per OB/GYN              Radha Sharpe MD  Hospitalist Service  [facility]  Securely message with [employer] (more info)  Text page via [employer] Paging/Directory   ______________________________________________________________________    Interval History   NAEO.     Physical Exam   Vital Signs: Temp: 98.1  F (36.7  C) Temp src: Oral BP: 115/75 Pulse: 70   Resp: 16   O2 Device: None (Room air)    Weight: 174 lbs 12.8 oz    Constitutional: Awake, alert, cooperative, no apparent distress.  Eyes: Conjunctiva and pupils examined and normal.  HEENT: Moist mucous membranes, normal dentition.  Respiratory: Non-labored  Skin: No rashes, no cyanosis, no edema on exposed skin  Neurologic: Cranial nerves 2-12 intact, moves all extremities  Psychiatric: Alert, oriented to person, place and time, no obvious anxiety or depression.    Medical Decision Making       35 MINUTES SPENT BY ME on the date of service doing chart review, history, exam, documentation & further activities per the note.      Data     I have personally reviewed the following data over the past 24 hrs:    N/A  \   11.8   / N/A     N/A N/A N/A /  144 (H)   N/A " N/A N/A \     TSH: N/A T4: N/A A1C: 6.0 (H)       Imaging results reviewed over the past 24 hrs:   No results found for this or any previous visit (from the past 24 hour(s)).

## 2024-05-03 NOTE — ANESTHESIA POSTPROCEDURE EVALUATION
Patient: Trupti Harvey    Labor epidural    Diagnosis: Labor pain    Anesthesia Type:  Labor epidural    Note:  Disposition: Outpatient   Postop Pain Control: Uneventful            Sign Out: Well controlled pain   PONV: No   Neuro/Psych: Uneventful            Sign Out: Acceptable/Baseline neuro status   Airway/Respiratory: Uneventful            Sign Out: Acceptable/Baseline resp. status   CV/Hemodynamics: Uneventful            Sign Out: Acceptable CV status; No obvious hypovolemia; No obvious fluid overload   Other NRE: NONE   DID A NON-ROUTINE EVENT OCCUR? No    Event details/Postop Comments:  Chart reviewed and assessed; no apparent complications identified.    Shukri Manriquez DO               Last vitals:  Vitals:    05/02/24 0830 05/02/24 1508 05/02/24 1944   BP: 114/70 120/77 110/75   Pulse: 68 76 66   Resp: 16 16 16   Temp: 37.2  C (98.9  F) 37.2  C (98.9  F) 36.4  C (97.5  F)   SpO2:          Electronically Signed By: Shukri Manriquez DO  May 2, 2024  8:52 PM

## 2024-05-11 ENCOUNTER — HEALTH MAINTENANCE LETTER (OUTPATIENT)
Age: 34
End: 2024-05-11

## 2024-12-12 ENCOUNTER — RX ONLY (RX ONLY)
Age: 34
End: 2024-12-12

## 2025-01-20 ENCOUNTER — APPOINTMENT (OUTPATIENT)
Age: 35
Setting detail: DERMATOLOGY
End: 2025-01-20

## 2025-01-20 DIAGNOSIS — D22 MELANOCYTIC NEVI: ICD-10-CM

## 2025-01-20 DIAGNOSIS — Z71.89 OTHER SPECIFIED COUNSELING: ICD-10-CM

## 2025-01-20 DIAGNOSIS — D18.0 HEMANGIOMA: ICD-10-CM

## 2025-01-20 DIAGNOSIS — L82.1 OTHER SEBORRHEIC KERATOSIS: ICD-10-CM

## 2025-01-20 DIAGNOSIS — Z87.2 PERSONAL HISTORY OF DISEASES OF THE SKIN AND SUBCUTANEOUS TISSUE: ICD-10-CM | Status: STABLE

## 2025-01-20 PROBLEM — D18.01 HEMANGIOMA OF SKIN AND SUBCUTANEOUS TISSUE: Status: ACTIVE | Noted: 2025-01-20

## 2025-01-20 PROBLEM — D22.5 MELANOCYTIC NEVI OF TRUNK: Status: ACTIVE | Noted: 2025-01-20

## 2025-01-20 PROCEDURE — ? COUNSELING

## 2025-01-20 PROCEDURE — ? SUNSCREEN RECOMMENDATIONS

## 2025-01-20 PROCEDURE — ? OBSERVATION

## 2025-01-20 PROCEDURE — 99203 OFFICE O/P NEW LOW 30 MIN: CPT

## 2025-01-20 ASSESSMENT — LOCATION SIMPLE DESCRIPTION DERM
LOCATION SIMPLE: LEFT UPPER BACK
LOCATION SIMPLE: CHEST
LOCATION SIMPLE: ABDOMEN
LOCATION SIMPLE: RIGHT THIGH
LOCATION SIMPLE: RIGHT LOWER BACK
LOCATION SIMPLE: LEFT FOREHEAD
LOCATION SIMPLE: UPPER BACK

## 2025-01-20 ASSESSMENT — LOCATION ZONE DERM
LOCATION ZONE: FACE
LOCATION ZONE: TRUNK
LOCATION ZONE: LEG

## 2025-01-20 ASSESSMENT — LOCATION DETAILED DESCRIPTION DERM
LOCATION DETAILED: LEFT INFERIOR MEDIAL FOREHEAD
LOCATION DETAILED: RIGHT SUPERIOR MEDIAL MIDBACK
LOCATION DETAILED: MIDDLE STERNUM
LOCATION DETAILED: RIGHT ANTERIOR PROXIMAL THIGH
LOCATION DETAILED: EPIGASTRIC SKIN
LOCATION DETAILED: LEFT SUPERIOR UPPER BACK
LOCATION DETAILED: INFERIOR THORACIC SPINE
LOCATION DETAILED: SUPERIOR THORACIC SPINE
LOCATION DETAILED: RIGHT INFERIOR LATERAL MIDBACK

## 2025-01-20 NOTE — HPI: FULL BODY SKIN EXAMINATION
What Type Of Note Output Would You Prefer (Optional)?: Bullet Format
What Is The Reason For Today's Visit?: Full Body Skin Examination
What Is The Reason For Today's Visit? (Being Monitored For X): surveillance against the recurrence of atypical nevi
Additional History: She states that she did have a lesions excised on her back about 5 years ago at [facility] in [place]. She has a history of a dysplastic nevus on the right medial thigh and the back (per patient).

## 2025-01-20 NOTE — PROCEDURE: OBSERVATION
Body Location Override (Optional - Billing Will Still Be Based On Selected Body Map Location If Applicable): right medial thigh
Detail Level: Detailed
Size Of Lesion In Cm (Optional): 0
Body Location Override (Optional - Billing Will Still Be Based On Selected Body Map Location If Applicable): left upper back
Body Location Override (Optional - Billing Will Still Be Based On Selected Body Map Location If Applicable): left lower back

## 2025-05-17 ENCOUNTER — HEALTH MAINTENANCE LETTER (OUTPATIENT)
Age: 35
End: 2025-05-17